# Patient Record
Sex: FEMALE | URBAN - METROPOLITAN AREA
[De-identification: names, ages, dates, MRNs, and addresses within clinical notes are randomized per-mention and may not be internally consistent; named-entity substitution may affect disease eponyms.]

---

## 2017-12-05 ENCOUNTER — INPATIENT (INPATIENT)
Facility: HOSPITAL | Age: 47
LOS: 2 days | Discharge: HOME | DRG: 629 | End: 2017-12-08
Attending: HOSPITALIST | Admitting: HOSPITALIST
Payer: MEDICARE

## 2017-12-05 VITALS
HEIGHT: 68 IN | SYSTOLIC BLOOD PRESSURE: 121 MMHG | RESPIRATION RATE: 16 BRPM | HEART RATE: 92 BPM | TEMPERATURE: 99 F | WEIGHT: 235.01 LBS | DIASTOLIC BLOOD PRESSURE: 64 MMHG | OXYGEN SATURATION: 99 %

## 2017-12-05 DIAGNOSIS — Z29.9 ENCOUNTER FOR PROPHYLACTIC MEASURES, UNSPECIFIED: ICD-10-CM

## 2017-12-05 DIAGNOSIS — N18.9 CHRONIC KIDNEY DISEASE, UNSPECIFIED: ICD-10-CM

## 2017-12-05 DIAGNOSIS — E11.621 TYPE 2 DIABETES MELLITUS WITH FOOT ULCER: ICD-10-CM

## 2017-12-05 DIAGNOSIS — I10 ESSENTIAL (PRIMARY) HYPERTENSION: ICD-10-CM

## 2017-12-05 DIAGNOSIS — E11.9 TYPE 2 DIABETES MELLITUS WITHOUT COMPLICATIONS: ICD-10-CM

## 2017-12-05 LAB
ALBUMIN SERPL ELPH-MCNC: 2.5 G/DL — LOW (ref 3.5–5)
ALP SERPL-CCNC: 139 U/L — HIGH (ref 40–120)
ALT FLD-CCNC: 13 U/L DA — SIGNIFICANT CHANGE UP (ref 10–60)
ANION GAP SERPL CALC-SCNC: 10 MMOL/L — SIGNIFICANT CHANGE UP (ref 5–17)
APPEARANCE UR: CLEAR — SIGNIFICANT CHANGE UP
APTT BLD: 36.2 SEC — SIGNIFICANT CHANGE UP (ref 27.5–37.4)
AST SERPL-CCNC: 14 U/L — SIGNIFICANT CHANGE UP (ref 10–40)
BASOPHILS # BLD AUTO: 0.1 K/UL — SIGNIFICANT CHANGE UP (ref 0–0.2)
BASOPHILS NFR BLD AUTO: 1 % — SIGNIFICANT CHANGE UP (ref 0–2)
BILIRUB SERPL-MCNC: 0.2 MG/DL — SIGNIFICANT CHANGE UP (ref 0.2–1.2)
BILIRUB UR-MCNC: NEGATIVE — SIGNIFICANT CHANGE UP
BUN SERPL-MCNC: 56 MG/DL — HIGH (ref 7–18)
CALCIUM SERPL-MCNC: 8.3 MG/DL — LOW (ref 8.4–10.5)
CHLORIDE SERPL-SCNC: 113 MMOL/L — HIGH (ref 96–108)
CO2 SERPL-SCNC: 19 MMOL/L — LOW (ref 22–31)
COLOR SPEC: YELLOW — SIGNIFICANT CHANGE UP
CREAT SERPL-MCNC: 6.81 MG/DL — HIGH (ref 0.5–1.3)
DIFF PNL FLD: ABNORMAL
EOSINOPHIL # BLD AUTO: 0.1 K/UL — SIGNIFICANT CHANGE UP (ref 0–0.5)
EOSINOPHIL NFR BLD AUTO: 0.8 % — SIGNIFICANT CHANGE UP (ref 0–6)
GLUCOSE SERPL-MCNC: 123 MG/DL — HIGH (ref 70–99)
GLUCOSE UR QL: 250
HCT VFR BLD CALC: 24.7 % — LOW (ref 34.5–45)
HGB BLD-MCNC: 8.4 G/DL — LOW (ref 11.5–15.5)
INR BLD: 0.97 RATIO — SIGNIFICANT CHANGE UP (ref 0.88–1.16)
KETONES UR-MCNC: NEGATIVE — SIGNIFICANT CHANGE UP
LACTATE SERPL-SCNC: 0.8 MMOL/L — SIGNIFICANT CHANGE UP (ref 0.7–2)
LEUKOCYTE ESTERASE UR-ACNC: NEGATIVE — SIGNIFICANT CHANGE UP
LYMPHOCYTES # BLD AUTO: 1.3 K/UL — SIGNIFICANT CHANGE UP (ref 1–3.3)
LYMPHOCYTES # BLD AUTO: 15.9 % — SIGNIFICANT CHANGE UP (ref 13–44)
MCHC RBC-ENTMCNC: 30.4 PG — SIGNIFICANT CHANGE UP (ref 27–34)
MCHC RBC-ENTMCNC: 34.2 GM/DL — SIGNIFICANT CHANGE UP (ref 32–36)
MCV RBC AUTO: 89 FL — SIGNIFICANT CHANGE UP (ref 80–100)
MONOCYTES # BLD AUTO: 0.4 K/UL — SIGNIFICANT CHANGE UP (ref 0–0.9)
MONOCYTES NFR BLD AUTO: 4.9 % — SIGNIFICANT CHANGE UP (ref 2–14)
NEUTROPHILS # BLD AUTO: 6.4 K/UL — SIGNIFICANT CHANGE UP (ref 1.8–7.4)
NEUTROPHILS NFR BLD AUTO: 77.4 % — HIGH (ref 43–77)
NITRITE UR-MCNC: NEGATIVE — SIGNIFICANT CHANGE UP
PH UR: 6 — SIGNIFICANT CHANGE UP (ref 5–8)
PLATELET # BLD AUTO: 215 K/UL — SIGNIFICANT CHANGE UP (ref 150–400)
POTASSIUM SERPL-MCNC: 4.3 MMOL/L — SIGNIFICANT CHANGE UP (ref 3.5–5.3)
POTASSIUM SERPL-SCNC: 4.3 MMOL/L — SIGNIFICANT CHANGE UP (ref 3.5–5.3)
PROT SERPL-MCNC: 7.3 G/DL — SIGNIFICANT CHANGE UP (ref 6–8.3)
PROT UR-MCNC: 500 MG/DL
PROTHROM AB SERPL-ACNC: 10.6 SEC — SIGNIFICANT CHANGE UP (ref 9.8–12.7)
RBC # BLD: 2.77 M/UL — LOW (ref 3.8–5.2)
RBC # FLD: 13 % — SIGNIFICANT CHANGE UP (ref 10.3–14.5)
SODIUM SERPL-SCNC: 142 MMOL/L — SIGNIFICANT CHANGE UP (ref 135–145)
SP GR SPEC: 1.02 — SIGNIFICANT CHANGE UP (ref 1.01–1.02)
UROBILINOGEN FLD QL: NEGATIVE — SIGNIFICANT CHANGE UP
WBC # BLD: 8.3 K/UL — SIGNIFICANT CHANGE UP (ref 3.8–10.5)
WBC # FLD AUTO: 8.3 K/UL — SIGNIFICANT CHANGE UP (ref 3.8–10.5)

## 2017-12-05 PROCEDURE — 99285 EMERGENCY DEPT VISIT HI MDM: CPT

## 2017-12-05 PROCEDURE — 99223 1ST HOSP IP/OBS HIGH 75: CPT | Mod: GC

## 2017-12-05 PROCEDURE — 93925 LOWER EXTREMITY STUDY: CPT | Mod: 26

## 2017-12-05 PROCEDURE — 73660 X-RAY EXAM OF TOE(S): CPT | Mod: 26,LT

## 2017-12-05 RX ORDER — PIPERACILLIN AND TAZOBACTAM 4; .5 G/20ML; G/20ML
3.38 INJECTION, POWDER, LYOPHILIZED, FOR SOLUTION INTRAVENOUS ONCE
Qty: 0 | Refills: 0 | Status: COMPLETED | OUTPATIENT
Start: 2017-12-05 | End: 2017-12-05

## 2017-12-05 RX ORDER — NIFEDIPINE 30 MG
120 TABLET, EXTENDED RELEASE 24 HR ORAL DAILY
Qty: 0 | Refills: 0 | Status: DISCONTINUED | OUTPATIENT
Start: 2017-12-05 | End: 2017-12-06

## 2017-12-05 RX ORDER — RANOLAZINE 500 MG/1
1 TABLET, FILM COATED, EXTENDED RELEASE ORAL
Qty: 0 | Refills: 0 | COMMUNITY

## 2017-12-05 RX ORDER — NIFEDIPINE 30 MG
120 TABLET, EXTENDED RELEASE 24 HR ORAL
Qty: 0 | Refills: 0 | COMMUNITY

## 2017-12-05 RX ORDER — ATORVASTATIN CALCIUM 80 MG/1
1 TABLET, FILM COATED ORAL
Qty: 0 | Refills: 0 | COMMUNITY

## 2017-12-05 RX ORDER — INFLUENZA VIRUS VACCINE 15; 15; 15; 15 UG/.5ML; UG/.5ML; UG/.5ML; UG/.5ML
0.5 SUSPENSION INTRAMUSCULAR ONCE
Qty: 0 | Refills: 0 | Status: COMPLETED | OUTPATIENT
Start: 2017-12-05 | End: 2017-12-08

## 2017-12-05 RX ORDER — INSULIN LISPRO 100/ML
20 VIAL (ML) SUBCUTANEOUS
Qty: 0 | Refills: 0 | Status: DISCONTINUED | OUTPATIENT
Start: 2017-12-05 | End: 2017-12-05

## 2017-12-05 RX ORDER — INSULIN LISPRO 100/ML
VIAL (ML) SUBCUTANEOUS
Qty: 0 | Refills: 0 | Status: DISCONTINUED | OUTPATIENT
Start: 2017-12-05 | End: 2017-12-08

## 2017-12-05 RX ORDER — INSULIN ASPART 100 [IU]/ML
20 INJECTION, SOLUTION SUBCUTANEOUS
Qty: 0 | Refills: 0 | COMMUNITY

## 2017-12-05 RX ORDER — INSULIN LISPRO 100/ML
15 VIAL (ML) SUBCUTANEOUS
Qty: 0 | Refills: 0 | Status: DISCONTINUED | OUTPATIENT
Start: 2017-12-05 | End: 2017-12-08

## 2017-12-05 RX ORDER — INSULIN GLARGINE 100 [IU]/ML
50 INJECTION, SOLUTION SUBCUTANEOUS AT BEDTIME
Qty: 0 | Refills: 0 | Status: DISCONTINUED | OUTPATIENT
Start: 2017-12-05 | End: 2017-12-05

## 2017-12-05 RX ORDER — FERROUS SULFATE 325(65) MG
325 TABLET ORAL DAILY
Qty: 0 | Refills: 0 | Status: DISCONTINUED | OUTPATIENT
Start: 2017-12-05 | End: 2017-12-08

## 2017-12-05 RX ORDER — VANCOMYCIN HCL 1 G
1000 VIAL (EA) INTRAVENOUS ONCE
Qty: 0 | Refills: 0 | Status: COMPLETED | OUTPATIENT
Start: 2017-12-05 | End: 2017-12-05

## 2017-12-05 RX ORDER — ASPIRIN/CALCIUM CARB/MAGNESIUM 324 MG
81 TABLET ORAL DAILY
Qty: 0 | Refills: 0 | Status: DISCONTINUED | OUTPATIENT
Start: 2017-12-05 | End: 2017-12-08

## 2017-12-05 RX ORDER — INSULIN DETEMIR 100/ML (3)
50 INSULIN PEN (ML) SUBCUTANEOUS
Qty: 0 | Refills: 0 | COMMUNITY

## 2017-12-05 RX ORDER — PIPERACILLIN AND TAZOBACTAM 4; .5 G/20ML; G/20ML
3.38 INJECTION, POWDER, LYOPHILIZED, FOR SOLUTION INTRAVENOUS EVERY 12 HOURS
Qty: 0 | Refills: 0 | Status: DISCONTINUED | OUTPATIENT
Start: 2017-12-05 | End: 2017-12-08

## 2017-12-05 RX ORDER — ASPIRIN/CALCIUM CARB/MAGNESIUM 324 MG
1 TABLET ORAL
Qty: 0 | Refills: 0 | COMMUNITY

## 2017-12-05 RX ORDER — FERROUS SULFATE 325(65) MG
1 TABLET ORAL
Qty: 0 | Refills: 0 | COMMUNITY

## 2017-12-05 RX ORDER — ATORVASTATIN CALCIUM 80 MG/1
40 TABLET, FILM COATED ORAL AT BEDTIME
Qty: 0 | Refills: 0 | Status: DISCONTINUED | OUTPATIENT
Start: 2017-12-05 | End: 2017-12-08

## 2017-12-05 RX ORDER — RANOLAZINE 500 MG/1
1000 TABLET, FILM COATED, EXTENDED RELEASE ORAL
Qty: 0 | Refills: 0 | Status: DISCONTINUED | OUTPATIENT
Start: 2017-12-05 | End: 2017-12-08

## 2017-12-05 RX ORDER — INSULIN GLARGINE 100 [IU]/ML
40 INJECTION, SOLUTION SUBCUTANEOUS AT BEDTIME
Qty: 0 | Refills: 0 | Status: DISCONTINUED | OUTPATIENT
Start: 2017-12-05 | End: 2017-12-06

## 2017-12-05 RX ADMIN — Medication 1: at 18:41

## 2017-12-05 RX ADMIN — Medication 250 MILLIGRAM(S): at 16:00

## 2017-12-05 RX ADMIN — Medication 325 MILLIGRAM(S): at 23:25

## 2017-12-05 RX ADMIN — ATORVASTATIN CALCIUM 40 MILLIGRAM(S): 80 TABLET, FILM COATED ORAL at 23:25

## 2017-12-05 RX ADMIN — Medication 81 MILLIGRAM(S): at 23:25

## 2017-12-05 RX ADMIN — Medication 15 UNIT(S): at 18:41

## 2017-12-05 RX ADMIN — PIPERACILLIN AND TAZOBACTAM 100 GRAM(S): 4; .5 INJECTION, POWDER, LYOPHILIZED, FOR SOLUTION INTRAVENOUS at 16:01

## 2017-12-05 RX ADMIN — Medication 25 MILLIGRAM(S): at 23:30

## 2017-12-05 NOTE — ED ADULT NURSE NOTE - OBJECTIVE STATEMENT
presents tot he ER , states sent by pmd for admission for necrotic 2nd toe Left foot , pt reports mild pain . noted Lt 2nd toe w/ open wound and necrosis on top of wound . + pulses .

## 2017-12-05 NOTE — ED PROVIDER NOTE - SKIN WOUND DESCRIPTION
L foot w/ faint DP pulse, foot is warm, L 2nd toe is darker in color that the other toes w/ 1cm dry ulcer on the dorsal aspect of the toe, no active drainage, no fould odor. L foot w/ faint DP pulse, foot is warm, L 2nd toe is duskier in color that the other toes w/ 1cm dry ulcer on the dorsal aspect of the toe, no active drainage, no foul odor.

## 2017-12-05 NOTE — H&P ADULT - PMH
DM (diabetes mellitus)    Essential hypertension  Hypertension  Hyperlipidemia  Dyslipidemia  Type 2 diabetes mellitus  Diabetes

## 2017-12-05 NOTE — H&P ADULT - PROBLEM SELECTOR PLAN 1
-Patient presented with L foot ulcer since last 2 weeks with no leucocytosis or fever.  -Xray showed Age-indeterminate deformity distal aspect of second proximal phalanx. Soft tissue swelling. No gross bone destruction  -S/p Vancomycin 1gm 1 dose. Follow Vanco level in Am since GFR is 7, standing order is not placed -Patient presented with L foot ulcer since last 2 weeks with no leucocytosis or fever.  -Xray showed Age-indeterminate deformity distal aspect of second proximal phalanx. Soft tissue swelling. No gross bone destruction  -S/p Vancomycin 1gm 1 dose. Follow Vanco level in Am since GFR is 7, and out vanco order accordingly   -Zosyn q 12, renally dosed discussed with attending.

## 2017-12-05 NOTE — H&P ADULT - PROBLEM SELECTOR PLAN 2
-Patient is on Levemir 50 and Novolog 20 TID, decreased it to lantus 40 and humalog 15 TID as patient can have decreased appetite in hospital. Increase Lantus/Humalog if FG runs high.  -Follow hbA1c  -Trend blood sugars

## 2017-12-05 NOTE — CONSULT NOTE ADULT - SUBJECTIVE AND OBJECTIVE BOX
S : 47y year old Female legally blind with PMH DM, HTN, HLD, neuropathy secondary to diabetes seen at bedside for left 2nd dorsal digital ulceration.  Patient relates to having the ulceration for 2 weeks.  Patient does not know how the ulceration appears, she states it could be possibly rubbing against tight shoes or wearing compression stockings. Patient saw a Podiatrist on 11/29 who recommended she see Dr. Florian for a second opinion and possible surgical intervention. Patients previous Podiatrist also recommended following up with a vascular surgeon but patient could not get an appointment with one. Patient has finished a course of Augmentin without relief. Patient saw Dr. Florian this morning who advised her to come to the ED for a further work up of the left second toe ulceration. Patient states she feels no pain in the toe. Patient complains the feet feel cold. Patient states she cannot assess if the ulcer is getting better or worse as she is legally blind.     Chief Complaint : Patient is a 47y old  Female who presents with a chief complaint of left 2nd toe ulceration     Patient admits to  (-) Fevers, (-) Chills, (-) Nausea, (-) Vomiting, (-) Shortness of Breath      PMH: DM (diabetes mellitus)  Type 2 diabetes mellitus  Hyperlipidemia  Essential hypertension    PSH:Other postprocedural status      Allergies:No Known Allergies      Labs:                          8.4    8.3   )-----------( 215      ( 05 Dec 2017 12:32 )             24.7     WBC Trend  8.3 Date (12-05 @ 12:32)      Chem  12-05    142  |  113<H>  |  56<H>  ----------------------------<  123<H>  4.3   |  19<L>  |  6.81<H>    Ca    8.3<L>      05 Dec 2017 12:32    TPro  7.3  /  Alb  2.5<L>  /  TBili  0.2  /  DBili  x   /  AST  14  /  ALT  13  /  AlkPhos  139<H>  12-05          T(F): 98.4 (12-05-17 @ 14:50), Max: 98.6 (12-05-17 @ 10:16)  HR: 92 (12-05-17 @ 14:50) (92 - 92)  BP: 146/72 (12-05-17 @ 14:50) (121/64 - 146/72)  RR: 18 (12-05-17 @ 14:50) (16 - 18)  SpO2: 100% (12-05-17 @ 14:50) (99% - 100%)  Wt(kg): --    O:   General: Pleasant  female NAD & AOX3.    Integument:  Skin warm, dry and supple bilateral.    Ulceration Left 2nd dorsal digit: + hyperkeratotic border, wound base Fibrogranular , wound size 1x 1 x 0.1 cm. +edema,  + alvaro-wound erythema, - purulence, - fluctuance, + tracking/tunneling, + probe to periosteum   Vascular: Dorsalis Pedis and Posterior Tibial pulses non palpable. Capillary re-fill time greater than 3 seconds   Neuro: Protective sensation diminished  to the level of the digits bilateral.  MSK: Muscle strength 5/5 all major muscle groups bilateral.    Deformity:  A: Left 2nd dorsal digital ulceration, ischemic toe     P:   Chart reviewed and Patient evaluated  Discussed diagnosis and treatment with patient  Wound flush with normal saline  Obtained wound culture to be sent to Pathology  Applied dry sterile dressing  X-rays reviewed : < from: Xray Toes, Left Foot (12.05.17 @ 13:01) >  FINDINGS:  Age-indeterminate deformity distal aspect of second proximal phalanx.   Soft tissue swelling. No gross bone destruction.   No dislocation   IMPRESSION:  Findings as described above . If osteomyelitis is a strong clinical   concern, consider MRI exam if there is no contraindication.  Continue with IV antibiotics As Per ID  Ordered CHEYENNE, pending   Vascular consult appreciated   WBAT bilaterally   Discussed importance of daily foot examinations and proper shoe gear and to importance of lower Fasting Blood Glucose levels.   Podiatry will follow while in house.  Discussed with Dr. Florian

## 2017-12-05 NOTE — H&P ADULT - PROBLEM SELECTOR PLAN 3
-Patient cr. is 6.8, baseline is >5 as per patient.   -No fluids given, as patient is not dehydrated.   -Not on HD

## 2017-12-05 NOTE — H&P ADULT - HISTORY OF PRESENT ILLNESS
47 F with PMH of Htn, DM, anemia, peripheral neuropathy presented to ED with l foot ulcer on 2nd toe x 2 weeks. Patient went to Dr Ghosh office and was prescribed Augmentin for 5 days but ulcer didn't improved. Also reports constipation and mild leg swellings. Denies urinary frequency and urgency. Denies fever, chills, SOB, nausea vomiting, diarrhea, chest pain, headache. As per patient she had cardiac cath done 1 year ago due to chest pain and that was normal.     SH: non smoker, non alcoholic, denies illicit drug use.   FH: Breat cancer in Mother and grand mother. 47 F with PMH of Htn, DM, anemia, peripheral neuropathy presented to ED with l foot ulcer on 2nd toe x 2 weeks. Patient went to Dr Ghosh office and was prescribed Augmentin for 5 days but ulcer didn't improved. Also reports constipation and mild leg swellings. Denies urinary frequency and urgency. Denies fever, chills, SOB, nausea vomiting, diarrhea, chest pain, headache or blood in stool. As per patient she had cardiac cath done 1 year ago due to chest pain and that was normal.     SH: non smoker, non alcoholic, denies illicit drug use.   FH: Breat cancer in Mother and grand mother.

## 2017-12-05 NOTE — H&P ADULT - ASSESSMENT
47 F with PMH of Htn, DM, anemia, peripheral neuropathy presented to ED with l foot ulcer on 2nd toe x 2 weeks.

## 2017-12-05 NOTE — ED PROVIDER NOTE - OBJECTIVE STATEMENT
46 y/o F pt w/ PMHx of DM was sent in by Dr. Tran for L toe ulcer. Pt states that she has had the ulceration to L toe for 2 weeks; pt has been taking Augmentin to no relief. Pt has peripheral neuropathy and does not feel any of her toes. Pt denies fever, chills, or any other complaints. NKDA. 48 y/o F pt w/ PMHx of DM was sent in by Dr. Cedeno for L toe ulcer. Pt states that she has had the ulceration to L toe for 2 weeks; pt has been taking Augmentin to no relief. Pt has peripheral neuropathy and does not feel any of her toes. Pt denies fever, chills, or any other complaints. NKDA. patient has chronic renal insufficiency, last creatinine >5, not on dialysis

## 2017-12-05 NOTE — H&P ADULT - ATTENDING COMMENTS
Patient is seen and examined in ED with podiatry resident. She is 47 F with PMH of Htn, DM, anemia, peripheral neuropathy presented to ED with l foot ulcer on 2nd toe x 2 weeks. Patient went to Dr Ghosh office and was prescribed Augmentin for 5 days but ulcer didn't improved. Also reports constipation and mild leg swellings. Denies urinary frequency and urgency. Denies fever, chills, SOB, nausea vomiting, diarrhea, chest pain, headache or blood in stool. As per patient she had cardiac cath done 1 year ago due to chest pain and that was normal.     ROS and PE as above    48 yo female presented with left 2 toe ulcer    1. Left second toe ulcer, non healing, diabetic ulcer, concern for osteomyelitis  Afebrile, no leukocytosis   The second toe is black, not painless.  Will get CHEYENNE, if CHEYENNE positive, will get vascular involved   Scheduled for MRI   BC sent   c/w Zosyn for now    2. DM; f/u HA1C  c/w with Lantus 40 un and Humalog 15 un TID   3. CKD; baseline cr. between 5-6. Patient follows as outpatient.   4. HTN: BP controlled: c/w home dose if Nifedipine  mg PO qd   5. Anemia of chronic disease vs renal disease   Will send anemia panel.   6. DVT prophylaxis: On Heparin

## 2017-12-06 ENCOUNTER — RESULT REVIEW (OUTPATIENT)
Age: 47
End: 2017-12-06

## 2017-12-06 ENCOUNTER — TRANSCRIPTION ENCOUNTER (OUTPATIENT)
Age: 47
End: 2017-12-06

## 2017-12-06 DIAGNOSIS — N17.9 ACUTE KIDNEY FAILURE, UNSPECIFIED: ICD-10-CM

## 2017-12-06 DIAGNOSIS — R60.0 LOCALIZED EDEMA: ICD-10-CM

## 2017-12-06 DIAGNOSIS — I12.9 HYPERTENSIVE CHRONIC KIDNEY DISEASE WITH STAGE 1 THROUGH STAGE 4 CHRONIC KIDNEY DISEASE, OR UNSPECIFIED CHRONIC KIDNEY DISEASE: ICD-10-CM

## 2017-12-06 DIAGNOSIS — N18.9 CHRONIC KIDNEY DISEASE, UNSPECIFIED: ICD-10-CM

## 2017-12-06 LAB
ANION GAP SERPL CALC-SCNC: 11 MMOL/L — SIGNIFICANT CHANGE UP (ref 5–17)
BUN SERPL-MCNC: 58 MG/DL — HIGH (ref 7–18)
CALCIUM SERPL-MCNC: 8.2 MG/DL — LOW (ref 8.4–10.5)
CHLORIDE SERPL-SCNC: 113 MMOL/L — HIGH (ref 96–108)
CHOLEST SERPL-MCNC: 159 MG/DL — SIGNIFICANT CHANGE UP (ref 10–199)
CO2 SERPL-SCNC: 17 MMOL/L — LOW (ref 22–31)
CREAT SERPL-MCNC: 6.92 MG/DL — HIGH (ref 0.5–1.3)
CULTURE RESULTS: NO GROWTH — SIGNIFICANT CHANGE UP
FERRITIN SERPL-MCNC: 35 NG/ML — SIGNIFICANT CHANGE UP (ref 15–150)
GLUCOSE SERPL-MCNC: 253 MG/DL — HIGH (ref 70–99)
HBA1C BLD-MCNC: 9.2 % — HIGH (ref 4–5.6)
HCT VFR BLD CALC: 23 % — LOW (ref 34.5–45)
HDLC SERPL-MCNC: 51 MG/DL — SIGNIFICANT CHANGE UP (ref 40–125)
HGB BLD-MCNC: 7.6 G/DL — LOW (ref 11.5–15.5)
IRON SATN MFR SERPL: 36 % — SIGNIFICANT CHANGE UP (ref 15–50)
IRON SATN MFR SERPL: 85 UG/DL — SIGNIFICANT CHANGE UP (ref 40–150)
LIPID PNL WITH DIRECT LDL SERPL: 77 MG/DL — SIGNIFICANT CHANGE UP
MAGNESIUM SERPL-MCNC: 1.9 MG/DL — SIGNIFICANT CHANGE UP (ref 1.6–2.6)
MCHC RBC-ENTMCNC: 29.8 PG — SIGNIFICANT CHANGE UP (ref 27–34)
MCHC RBC-ENTMCNC: 33.1 GM/DL — SIGNIFICANT CHANGE UP (ref 32–36)
MCV RBC AUTO: 90.1 FL — SIGNIFICANT CHANGE UP (ref 80–100)
PHOSPHATE SERPL-MCNC: 5 MG/DL — HIGH (ref 2.5–4.5)
PLATELET # BLD AUTO: 190 K/UL — SIGNIFICANT CHANGE UP (ref 150–400)
POTASSIUM SERPL-MCNC: 4.5 MMOL/L — SIGNIFICANT CHANGE UP (ref 3.5–5.3)
POTASSIUM SERPL-SCNC: 4.5 MMOL/L — SIGNIFICANT CHANGE UP (ref 3.5–5.3)
POTASSIUM UR-SCNC: 15 MMOL/L — LOW (ref 25–125)
RBC # BLD: 2.56 M/UL — LOW (ref 3.8–5.2)
RBC # FLD: 13.2 % — SIGNIFICANT CHANGE UP (ref 10.3–14.5)
SODIUM SERPL-SCNC: 141 MMOL/L — SIGNIFICANT CHANGE UP (ref 135–145)
SODIUM UR-SCNC: 89 MMOL/L — SIGNIFICANT CHANGE UP (ref 40–220)
SPECIMEN SOURCE: SIGNIFICANT CHANGE UP
TIBC SERPL-MCNC: 235 UG/DL — LOW (ref 250–450)
TOTAL CHOLESTEROL/HDL RATIO MEASUREMENT: 3.1 RATIO — LOW (ref 3.3–7.1)
TRANSFERRIN SERPL-MCNC: 181 MG/DL — LOW (ref 200–360)
TRIGL SERPL-MCNC: 156 MG/DL — HIGH (ref 10–149)
TSH SERPL-MCNC: 1.72 UU/ML — SIGNIFICANT CHANGE UP (ref 0.34–4.82)
UIBC SERPL-MCNC: 150 UG/DL — SIGNIFICANT CHANGE UP (ref 110–370)
VANCOMYCIN TROUGH SERPL-MCNC: 12.7 UG/ML — SIGNIFICANT CHANGE UP (ref 10–20)
VIT B12 SERPL-MCNC: 686 PG/ML — SIGNIFICANT CHANGE UP (ref 243–894)
WBC # BLD: 7.3 K/UL — SIGNIFICANT CHANGE UP (ref 3.8–10.5)
WBC # FLD AUTO: 7.3 K/UL — SIGNIFICANT CHANGE UP (ref 3.8–10.5)

## 2017-12-06 PROCEDURE — 76775 US EXAM ABDO BACK WALL LIM: CPT | Mod: 26

## 2017-12-06 PROCEDURE — 88305 TISSUE EXAM BY PATHOLOGIST: CPT | Mod: 26

## 2017-12-06 PROCEDURE — 88311 DECALCIFY TISSUE: CPT | Mod: 26

## 2017-12-06 PROCEDURE — 99233 SBSQ HOSP IP/OBS HIGH 50: CPT | Mod: GC

## 2017-12-06 RX ORDER — VANCOMYCIN HCL 1 G
500 VIAL (EA) INTRAVENOUS ONCE
Qty: 0 | Refills: 0 | Status: COMPLETED | OUTPATIENT
Start: 2017-12-06 | End: 2017-12-06

## 2017-12-06 RX ORDER — INSULIN LISPRO 100/ML
VIAL (ML) SUBCUTANEOUS AT BEDTIME
Qty: 0 | Refills: 0 | Status: DISCONTINUED | OUTPATIENT
Start: 2017-12-06 | End: 2017-12-08

## 2017-12-06 RX ORDER — NITROGLYCERIN 6.5 MG
1 CAPSULE, EXTENDED RELEASE ORAL ONCE
Qty: 0 | Refills: 0 | Status: COMPLETED | OUTPATIENT
Start: 2017-12-06 | End: 2017-12-06

## 2017-12-06 RX ORDER — NITROGLYCERIN 6.5 MG
1 CAPSULE, EXTENDED RELEASE ORAL DAILY
Qty: 0 | Refills: 0 | Status: DISCONTINUED | OUTPATIENT
Start: 2017-12-07 | End: 2017-12-08

## 2017-12-06 RX ORDER — NIFEDIPINE 30 MG
120 TABLET, EXTENDED RELEASE 24 HR ORAL AT BEDTIME
Qty: 0 | Refills: 0 | Status: DISCONTINUED | OUTPATIENT
Start: 2017-12-06 | End: 2017-12-08

## 2017-12-06 RX ORDER — METOPROLOL TARTRATE 50 MG
25 TABLET ORAL
Qty: 0 | Refills: 0 | Status: DISCONTINUED | OUTPATIENT
Start: 2017-12-06 | End: 2017-12-08

## 2017-12-06 RX ORDER — INSULIN LISPRO 100/ML
2 VIAL (ML) SUBCUTANEOUS ONCE
Qty: 0 | Refills: 0 | Status: COMPLETED | OUTPATIENT
Start: 2017-12-06 | End: 2017-12-06

## 2017-12-06 RX ORDER — NITROGLYCERIN 6.5 MG
CAPSULE, EXTENDED RELEASE ORAL
Qty: 0 | Refills: 0 | Status: DISCONTINUED | OUTPATIENT
Start: 2017-12-06 | End: 2017-12-08

## 2017-12-06 RX ADMIN — Medication 25 MILLIGRAM(S): at 17:47

## 2017-12-06 RX ADMIN — ATORVASTATIN CALCIUM 40 MILLIGRAM(S): 80 TABLET, FILM COATED ORAL at 22:03

## 2017-12-06 RX ADMIN — RANOLAZINE 1000 MILLIGRAM(S): 500 TABLET, FILM COATED, EXTENDED RELEASE ORAL at 06:11

## 2017-12-06 RX ADMIN — Medication 2 UNIT(S): at 22:03

## 2017-12-06 RX ADMIN — Medication 120 MILLIGRAM(S): at 06:10

## 2017-12-06 RX ADMIN — Medication 100 MILLIGRAM(S): at 11:04

## 2017-12-06 RX ADMIN — Medication 325 MILLIGRAM(S): at 17:49

## 2017-12-06 RX ADMIN — Medication 3: at 09:24

## 2017-12-06 RX ADMIN — Medication 25 MILLIGRAM(S): at 22:03

## 2017-12-06 RX ADMIN — Medication 81 MILLIGRAM(S): at 17:49

## 2017-12-06 RX ADMIN — Medication 15 UNIT(S): at 12:47

## 2017-12-06 RX ADMIN — PIPERACILLIN AND TAZOBACTAM 12.5 GRAM(S): 4; .5 INJECTION, POWDER, LYOPHILIZED, FOR SOLUTION INTRAVENOUS at 06:10

## 2017-12-06 RX ADMIN — RANOLAZINE 1000 MILLIGRAM(S): 500 TABLET, FILM COATED, EXTENDED RELEASE ORAL at 17:47

## 2017-12-06 RX ADMIN — Medication 15 UNIT(S): at 09:24

## 2017-12-06 RX ADMIN — PIPERACILLIN AND TAZOBACTAM 12.5 GRAM(S): 4; .5 INJECTION, POWDER, LYOPHILIZED, FOR SOLUTION INTRAVENOUS at 17:47

## 2017-12-06 RX ADMIN — Medication 120 MILLIGRAM(S): at 22:03

## 2017-12-06 RX ADMIN — Medication 2: at 12:47

## 2017-12-06 NOTE — PROGRESS NOTE ADULT - ASSESSMENT
47 F with PMH of Htn, DM, anemia, peripheral neuropathy presented to ED with l foot ulcer on 2nd toe x 2 weeks. 47 F with PMH of Htn, DM, anemia, peripheral neuropathy presented to ED with l foot ulcer on 2nd toe x 2 weeks.    Podiatry Obtained biopsy to be sent to pathology.  -Podiatry performed bedside debridement of 2nd   -Ordered NitroPaste to be applied to Left foot.  -pending MRI results

## 2017-12-06 NOTE — DISCHARGE NOTE ADULT - PATIENT PORTAL LINK FT
“You can access the [facility] Patient Portal, offered by [facility], by registering with the following website: [email or web address]”

## 2017-12-06 NOTE — CONSULT NOTE ADULT - PROBLEM SELECTOR RECOMMENDATION 2
CKD Stage unclear: due to diabetic nephropathy  Renal function stable. Avoid nephrotoxins/ RCA/ NSAIDs. Monitor BMP.  Obtain baseline creatinine from outpatient nephrologist

## 2017-12-06 NOTE — DISCHARGE NOTE ADULT - CARE PROVIDER_API CALL
Todd Florian (HOLDEN), Foot and Ankle Surgery; Podiatric Medicine and Surgery; [facility] RearfootAnkle Surgery  [address] [place] [address]  Phone: [phone]  Fax: [phone]

## 2017-12-06 NOTE — DISCHARGE NOTE ADULT - HOSPITAL COURSE
47 F with PMH of Htn, DM, anemia, peripheral neuropathy presented to ED with l foot ulcer on 2nd toe x 2 weeks. Patient went to Dr Ghosh office and was prescribed Augmentin for 5 days but ulcer didn't improved. Also reports constipation and mild leg swellings. Denies urinary frequency and urgency. Denies fever, chills, SOB, nausea vomiting, diarrhea, chest pain, headache or blood in stool. As per patient she had cardiac cath done 1 year ago due to chest pain and that was normal.     SH: non smoker, non alcoholic, denies illicit drug use.   FH: Breat cancer in Mother and grand mother.    In the ED the patient was given IV antibiotics and evaluated. Podiatry was consulted.    On the floor, Podiatry did a bedside debridement of the 2nd digit on the left foot. Zosyn was given to the patient. 47 F with PMH of Htn, DM, anemia, peripheral neuropathy presented to ED with l foot ulcer on 2nd toe x 2 weeks. Patient went to Dr Ghosh office and was prescribed Augmentin for 5 days but ulcer didn't improved. Also reports constipation and mild leg swellings. Denies urinary frequency and urgency. Denies fever, chills, SOB, nausea vomiting, diarrhea, chest pain, headache or blood in stool. As per patient she had cardiac cath done 1 year ago due to chest pain and that was normal.     SH: non smoker, non alcoholic, denies illicit drug use.   FH: Breat cancer in Mother and grand mother.    In the ED the patient was given IV antibiotics and evaluated. Podiatry was consulted.     On the floor, Podiatry did a bedside debridement of the 2nd digit on the left foot and specimen sent to pathology. Zosyn was given to the patient. MRI was taken of the foot.   Nephrology consulted and did not recommend dialysis based on previous history of elevated creatinine levels. 47 F with PMH of Htn, DM, anemia, peripheral neuropathy presented to ED with l foot ulcer on 2nd toe x 2 weeks. Patient went to Dr Ghosh office and was prescribed Augmentin for 5 days but ulcer didn't improved. Also reports constipation and mild leg swellings. Denies urinary frequency and urgency. Denies fever, chills, SOB, nausea vomiting, diarrhea, chest pain, headache or blood in stool. As per patient she had cardiac cath done 1 year ago due to chest pain and that was normal.     SH: non smoker, non alcoholic, denies illicit drug use.   FH: Breat cancer in Mother and grand mother.    In the ED the patient was given IV antibiotics and evaluated. Podiatry was consulted.     On the floor, Podiatry did a bedside debridement of the 2nd digit on the left foot and specimen sent to pathology. Zosyn was given to the patient. MRI was taken of the foot. Nephrology consulted and did not recommend dialysis based on previous history of elevated creatinine levels. ID consulted and recommended oral antibiotics upon discharge.

## 2017-12-06 NOTE — DISCHARGE NOTE ADULT - CARE PLAN
Principal Discharge DX:	Diabetic foot ulcer  Goal:	Prevent further infection of the ulcer  Instructions for follow-up, activity and diet:	You need to follow up with Dr. Ghosh for podiatric services as an outpatient.  Secondary Diagnosis:	DM (diabetes mellitus)  Instructions for follow-up, activity and diet:	You need to continue to monitor your blood sugar and take your diabetes medications accordingly  Secondary Diagnosis:	CKD (chronic kidney disease)  Instructions for follow-up, activity and diet:	You need to continue to follow up with your nephrologist, Dr. Martines at his office. Principal Discharge DX:	Diabetic foot ulcer  Goal:	Prevent further infection of the ulcer  Instructions for follow-up, activity and diet:	You need to follow up with Dr. Ghosh for podiatric services as an outpatient. You also need to go to hyperbarics to assist in healing the ulceration. Please go to your pharmacy and  the prescribed Levaquin antibiotics.  Secondary Diagnosis:	DM (diabetes mellitus)  Instructions for follow-up, activity and diet:	You need to continue to monitor your blood sugar and take your diabetes medications accordingly  Secondary Diagnosis:	CKD (chronic kidney disease)  Instructions for follow-up, activity and diet:	You need to continue to follow up with your nephrologist, Dr. Martines at his office.

## 2017-12-06 NOTE — DISCHARGE NOTE ADULT - PLAN OF CARE
Prevent further infection of the ulcer You need to follow up with Dr. Ghosh for podiatric services as an outpatient. You need to continue to monitor your blood sugar and take your diabetes medications accordingly You need to continue to follow up with your nephrologist, Dr. Martines at his office. You need to follow up with Dr. Ghosh for podiatric services as an outpatient. You also need to go to hyperbarics to assist in healing the ulceration. Please go to your pharmacy and  the prescribed Levaquin antibiotics.

## 2017-12-06 NOTE — DISCHARGE NOTE ADULT - MEDICATION SUMMARY - MEDICATIONS TO TAKE
I will START or STAY ON the medications listed below when I get home from the hospital:    aspirin 81 mg oral tablet  -- 1 tab(s) by mouth once a day  -- Indication: For Pain    Ranexa 1000 mg oral tablet, extended release  -- 1 tab(s) by mouth once a day (at bedtime)  -- Indication: For Chest pain    Lyrica 25 mg oral capsule  -- 1 cap(s) by mouth once a day  -- Indication: For nerve pain    NovoLOG 100 units/mL subcutaneous solution  -- 20 unit(s) subcutaneous 3 times a day (before meals)  -- Indication: For Diabetes    Levemir 100 units/mL subcutaneous solution  -- 50 unit(s) subcutaneous once a day (at bedtime)  -- Indication: For Diabetes    atorvastatin 40 mg oral tablet  -- 1 tab(s) by mouth once a day  -- Indication: For Cholesterol    NIFEdipine  -- 120 milligram(s) by mouth once a day  -- Indication: For high blood pressure    ferrous sulfate 325 mg (65 mg elemental iron) oral tablet  -- 1 tab(s) by mouth once a day  -- Indication: For Anemia    brimonidine 0.2% ophthalmic solution  -- 1 drop(s) to each affected eye 2 times a day  -- Indication: For glaucoma    dorzolamide 2% ophthalmic solution  -- 1 drop(s) to each affected eye 2 times a day  -- Indication: For glaucoma    latanoprost 0.005% ophthalmic solution  -- 1 drop(s) to each affected eye once a day (at bedtime)  -- Indication: For glaucoma

## 2017-12-06 NOTE — PROGRESS NOTE ADULT - PROBLEM SELECTOR PLAN 3
-Patient cr. is 6.8, baseline is >5 as per patient.   -No fluids given, as patient is not dehydrated.   -Not on HD -Patient cr. is 6.8, baseline is >5 as per patient.   -No fluids given, as patient is not dehydrated.   -Not on HD  -Dr. Edmonds consulted

## 2017-12-06 NOTE — CONSULT NOTE ADULT - SUBJECTIVE AND OBJECTIVE BOX
[facility] NEPHROLOGY- CONSULTATION NOTE    Patient is a 47y Female with known CKD, unknown baseline, cared for by Dr. Martines ([facility]), who presented to the hospital with diabetic foot ulcer.  Pt recently had a medication that was just stopped (maybe an ACEi or ARB) in attempt to improve renal function.  Pt always has some edema and reports that her edema is much better than it was several months ago. She does not think she is on a diuretic now, but is not sure.   Pt feels well overall.  No uremic symptoms such as lethargy, agitation, nausea/vomiting, difficultly sleeping. No ACEi/ARBs/NSAIDs/Diuretics/IV Contrast.      PAST MEDICAL & SURGICAL HISTORY:  DM (diabetes mellitus)  Type 2 diabetes mellitus: Diabetes  Hyperlipidemia: Dyslipidemia  Essential hypertension: Hypertension  Other postprocedural status: S/P laparoscopic cholecystectomy  CKD    No Known Allergies    Home Medications Reviewed  Hospital Medications:   MEDICATIONS  (STANDING):  aspirin  chewable 81 milliGRAM(s) Oral daily  atorvastatin 40 milliGRAM(s) Oral at bedtime  ferrous    sulfate 325 milliGRAM(s) Oral daily  influenza   Vaccine 0.5 milliLiter(s) IntraMuscular once  insulin lispro (HumaLOG) corrective regimen sliding scale   SubCutaneous three times a day before meals  insulin lispro Injectable (HumaLOG) 15 Unit(s) SubCutaneous three times a day before meals  NIFEdipine  milliGRAM(s) Oral at bedtime  nitroglycerin    2% Ointment 1 Inch(s) Transdermal once  nitroglycerin    2% Ointment      piperacillin/tazobactam IVPB. 3.375 Gram(s) IV Intermittent every 12 hours  pregabalin 25 milliGRAM(s) Oral at bedtime  ranolazine 1000 milliGRAM(s) Oral two times a day    SOCIAL HISTORY:  Denies ETOh,Smoking,   FAMILY HISTORY:    REVIEW OF SYSTEMS:  CONSTITUTIONAL: No weakness, fevers or chills  EYES/ENT: No visual changes;  No vertigo or throat pain   NECK: No pain or stiffness  RESPIRATORY: No cough, wheezing, hemoptysis; No shortness of breath  CARDIOVASCULAR: No chest pain or palpitations.  GASTROINTESTINAL: No abdominal or epigastric pain. No nausea, vomiting, or hematemesis; No diarrhea or constipation. No melena or hematochezia.  GENITOURINARY: No dysuria, frequency, foamy urine, urinary urgency, incontinence or hematuria  NEUROLOGICAL: No numbness or weakness  SKIN: No itching, burning, rashes, or lesions   VASCULAR: No bilateral lower extremity edema.   All other review of systems is negative unless indicated above.    VITALS:  T(F): 97.6 (17 @ 08:00), Max: 99 (17 @ 21:36)  HR: 93 (17 @ 08:00)  BP: 151/90 (17 @ 08:00)  RR: 18 (17 @ 08:00)  SpO2: 99% (17 @ 08:00)  Wt(kg): --      PHYSICAL EXAM:  Constitutional: NAD  HEENT: anicteric sclera, oropharynx clear, MMM  Neck: No JVD  Respiratory: CTAB, no wheezes, rales or rhonchi  Cardiovascular: S1, S2, RRR  Gastrointestinal: BS+, soft, NT/ND  Extremities: No cyanosis or clubbing. 1-2+ B LE edema  Neurological: A/O x 3, no focal deficits  Psychiatric: Normal mood, normal affect  : No CVA tenderness. No marks.   Skin: No rashes  Vascular Access: none    LABS:      141  |  113<H>  |  58<H>  ----------------------------<  253<H>  4.5   |  17<L>  |  6.92<H>    Ca    8.2<L>      06 Dec 2017 07:08  Phos  5.0       Mg     1.9         TPro  7.3  /  Alb  2.5<L>  /  TBili  0.2  /  DBili      /  AST  14  /  ALT  13  /  AlkPhos  139<H>      Creatinine Trend: 6.92 <--, 6.81 <--                        7.6    7.3   )-----------( 190      ( 06 Dec 2017 07:08 )             23.0     Urine Studies:  Urinalysis Basic - ( 05 Dec 2017 13:09 )    Color: Yellow / Appearance: Clear / S.020 / pH:   Gluc:  / Ketone: Negative  / Bili: Negative / Urobili: Negative   Blood:  / Protein: 500 mg/dL / Nitrite: Negative   Leuk Esterase: Negative / RBC: 2-5 /HPF / WBC 11-25 /HPF   Sq Epi:  / Non Sq Epi: Many /HPF / Bacteria: Negative /HPF      Sodium, Random Urine: 89 mmol/L ( @ 10:37)  Potassium, Random Urine: 15 mmol/L ( @ 10:37)  Creatinine, Random Urine: 43 mg/dL ( @ 10:37)

## 2017-12-06 NOTE — CONSULT NOTE ADULT - SUBJECTIVE AND OBJECTIVE BOX
47 F with PMH of Htn, DM, anemia, peripheral neuropathy presented to ED with l foot ulcer on 2nd toe x 2 weeks. Patient went to Dr Ghosh office and was prescribed Augmentin for 5 days but ulcer didn't improved. Pt had bedside debridement, wound culture and biopsy today. Pt is schedule for MRI to r/o OM since XR findings were questionable. Afebrile, no leukocytosis.       FH: Breat cancer in Mother and grand mother.    REVIEW OF SYSTEMS:  [  ] Not able to illicit    General: NAD  Chest: no chest pain   GI: no abdominal pain   : no dysuria no urinary frequency,   Skin: no rash, no pruritus   Musculoskeletal:	 intact ROM, no pain  Neuro: AAOx3    PAST MEDICAL & SURGICAL HISTORY:  DM (diabetes mellitus)  Type 2 diabetes mellitus: Diabetes  Hyperlipidemia: Dyslipidemia  Essential hypertension: Hypertension  Other postprocedural status: S/P laparoscopic cholecystectomy    ALLERGIES: No Known Allergies    MEDS:  aspirin  chewable 81 milliGRAM(s) Oral daily  atorvastatin 40 milliGRAM(s) Oral at bedtime  ferrous    sulfate 325 milliGRAM(s) Oral daily  influenza   Vaccine 0.5 milliLiter(s) IntraMuscular once  insulin lispro (HumaLOG) corrective regimen sliding scale   SubCutaneous three times a day before meals  insulin lispro Injectable (HumaLOG) 15 Unit(s) SubCutaneous three times a day before meals  NIFEdipine  milliGRAM(s) Oral daily  nitroglycerin    2% Ointment 1 Inch(s) Transdermal once  nitroglycerin    2% Ointment      piperacillin/tazobactam IVPB. 3.375 Gram(s) IV Intermittent every 12 hours  pregabalin 25 milliGRAM(s) Oral daily  ranolazine 1000 milliGRAM(s) Oral two times a day    SOCIAL HISTORY:  Smoker:      FAMILY HISTORY:    VITALS:  Vital Signs Last 24 Hrs  T(C): 36.4 (06 Dec 2017 08:00), Max: 37.2 (05 Dec 2017 21:36)  T(F): 97.6 (06 Dec 2017 08:00), Max: 99 (05 Dec 2017 21:36)  HR: 93 (06 Dec 2017 08:00) (92 - 99)  BP: 151/90 (06 Dec 2017 08:00) (139/81 - 156/75)  BP(mean): --  RR: 18 (06 Dec 2017 08:00) (16 - 18)  SpO2: 99% (06 Dec 2017 08:00) (99% - 100%)      PHYSICAL EXAM:    Constitutional: NAD  HEENT: moist mucosa  Neck: supple neck, no JVD  Respiratory: clear, no wheezing, no rales, no rhonchi   Cardiovascular: RRR, no RMG  Gastrointestinal: soft, non tender, + BS, no guarding, obese   Extremities: Ulceration Left 2nd dorsal digit with hyperkeratotic border, wound base Fibro granular, alvaro-wound erythema,   Ortho: no acute pathology   Neuro: AAOx3, no focal findings       LABS/DIAGNOSTIC TESTS:                        7.6    7.3   )-----------( 190      ( 06 Dec 2017 07:08 )             23.0     WBC Count: 7.3 K/uL ( @ 07:08)  WBC Count: 8.3 K/uL ( @ 12:32)    12    141  |  113<H>  |  58<H>  ----------------------------<  253<H>  4.5   |  17<L>  |  6.92<H>    Ca    8.2<L>      06 Dec 2017 07:08  Phos  5.0       Mg     1.9         TPro  7.3  /  Alb  2.5<L>  /  TBili  0.2  /  DBili  x   /  AST  14  /  ALT  13  /  AlkPhos  139<H>  12    Urinalysis Basic - ( 05 Dec 2017 13:09 )    Color: Yellow / Appearance: Clear / S.020 / pH: x  Gluc: x / Ketone: Negative  / Bili: Negative / Urobili: Negative   Blood: x / Protein: 500 mg/dL / Nitrite: Negative   Leuk Esterase: Negative / RBC: 2-5 /HPF / WBC 11-25 /HPF   Sq Epi: x / Non Sq Epi: Many /HPF / Bacteria: Negative /HPF      LIVER FUNCTIONS - ( 05 Dec 2017 12:32 )  Alb: 2.5 g/dL / Pro: 7.3 g/dL / ALK PHOS: 139 U/L / ALT: 13 U/L DA / AST: 14 U/L / GGT: x           PT/INR - ( 05 Dec 2017 13:09 )   PT: 10.6 sec;   INR: 0.97 ratio         PTT - ( 05 Dec 2017 13:09 )  PTT:36.2 sec  Lactate, Blood: 0.8 mmol/L ( @ 12:32)    ABG -     CULTURES:       RADIOLOGY: 47 F with PMH of Htn, DM, anemia, peripheral neuropathy presented to ED with l foot ulcer on 2nd toe x 2 weeks. Patient went to Dr Ghosh office and was prescribed Augmentin for 5 days but ulcer didn't improved and was sent to ER. Pt had bedside debridement, wound culture and biopsy today. Pt is schedule for MRI to r/o OM since XR findings were questionable. Afebrile, no leukocytosis.       FH: Breat cancer in Mother and grand mother.    REVIEW OF SYSTEMS:  [  ] Not able to illicit    General: NAD  Chest: no chest pain   GI: no abdominal pain   : no dysuria no urinary frequency,   Skin: no rash, no pruritus   Musculoskeletal:	 intact ROM, no pain  Neuro: AAOx3    PAST MEDICAL & SURGICAL HISTORY:  DM (diabetes mellitus)  Type 2 diabetes mellitus: Diabetes  Hyperlipidemia: Dyslipidemia  Essential hypertension: Hypertension  Other postprocedural status: S/P laparoscopic cholecystectomy    ALLERGIES: No Known Allergies    MEDS:  aspirin  chewable 81 milliGRAM(s) Oral daily  atorvastatin 40 milliGRAM(s) Oral at bedtime  ferrous    sulfate 325 milliGRAM(s) Oral daily  influenza   Vaccine 0.5 milliLiter(s) IntraMuscular once  insulin lispro (HumaLOG) corrective regimen sliding scale   SubCutaneous three times a day before meals  insulin lispro Injectable (HumaLOG) 15 Unit(s) SubCutaneous three times a day before meals  NIFEdipine  milliGRAM(s) Oral daily  nitroglycerin    2% Ointment 1 Inch(s) Transdermal once  nitroglycerin    2% Ointment      piperacillin/tazobactam IVPB. 3.375 Gram(s) IV Intermittent every 12 hours  pregabalin 25 milliGRAM(s) Oral daily  ranolazine 1000 milliGRAM(s) Oral two times a day    SOCIAL HISTORY:  Smoker:      FAMILY HISTORY:    VITALS:  Vital Signs Last 24 Hrs  T(C): 36.4 (06 Dec 2017 08:00), Max: 37.2 (05 Dec 2017 21:36)  T(F): 97.6 (06 Dec 2017 08:00), Max: 99 (05 Dec 2017 21:36)  HR: 93 (06 Dec 2017 08:00) (92 - 99)  BP: 151/90 (06 Dec 2017 08:00) (139/81 - 156/75)  BP(mean): --  RR: 18 (06 Dec 2017 08:00) (16 - 18)  SpO2: 99% (06 Dec 2017 08:00) (99% - 100%)      PHYSICAL EXAM:    Constitutional: NAD  HEENT: moist mucosa  Neck: supple neck, no JVD  Respiratory: clear, no wheezing, no rales, no rhonchi   Cardiovascular: RRR, no RMG  Gastrointestinal: soft, non tender, + BS, no guarding, obese   Extremities: Ulceration Left 2nd dorsal digit with hyperkeratotic border, wound base Fibro granular, alvaro-wound erythema,   Ortho: no acute pathology   Neuro: AAOx3, no focal findings       LABS/DIAGNOSTIC TESTS:                        7.6    7.3   )-----------( 190      ( 06 Dec 2017 07:08 )             23.0     WBC Count: 7.3 K/uL ( @ 07:08)  WBC Count: 8.3 K/uL ( @ 12:32)    12    141  |  113<H>  |  58<H>  ----------------------------<  253<H>  4.5   |  17<L>  |  6.92<H>    Ca    8.2<L>      06 Dec 2017 07:08  Phos  5.0     12  Mg     1.9         TPro  7.3  /  Alb  2.5<L>  /  TBili  0.2  /  DBili  x   /  AST  14  /  ALT  13  /  AlkPhos  139<H>  12    Urinalysis Basic - ( 05 Dec 2017 13:09 )    Color: Yellow / Appearance: Clear / S.020 / pH: x  Gluc: x / Ketone: Negative  / Bili: Negative / Urobili: Negative   Blood: x / Protein: 500 mg/dL / Nitrite: Negative   Leuk Esterase: Negative / RBC: 2-5 /HPF / WBC 11-25 /HPF   Sq Epi: x / Non Sq Epi: Many /HPF / Bacteria: Negative /HPF      LIVER FUNCTIONS - ( 05 Dec 2017 12:32 )  Alb: 2.5 g/dL / Pro: 7.3 g/dL / ALK PHOS: 139 U/L / ALT: 13 U/L DA / AST: 14 U/L / GGT: x           PT/INR - ( 05 Dec 2017 13:09 )   PT: 10.6 sec;   INR: 0.97 ratio         PTT - ( 05 Dec 2017 13:09 )  PTT:36.2 sec  Lactate, Blood: 0.8 mmol/L ( @ 12:32)    ABG -     CULTURES:       RADIOLOGY: 47 F with PMH of Htn, DM, anemia, peripheral neuropathy presented to ED with l foot ulcer on 2nd toe x 2 weeks. Patient went to Dr Ghosh office and was prescribed Augmentin for 5 days but ulcer didn't improved and was sent to ER. Pt had bedside debridement, wound culture and biopsy today. Pt is schedule for MRI to r/o OM since XR findings were questionable. Afebrile, no leukocytosis. Pt has CKD and has been refusing dialysis and even access. she had bedside debridement of her toe today.       FH: Breat cancer in Mother and grand mother.    REVIEW OF SYSTEMS:  [  ] Not able to illicit    General: NAD  Chest: no chest pain   GI: no abdominal pain   : no dysuria no urinary frequency,   Skin: no rash, no pruritus   Musculoskeletal:	 intact ROM, no pain  Neuro: AAOx3    PAST MEDICAL & SURGICAL HISTORY:  DM (diabetes mellitus)  Type 2 diabetes mellitus: Diabetes  Hyperlipidemia: Dyslipidemia  Essential hypertension: Hypertension  Other postprocedural status: S/P laparoscopic cholecystectomy    ALLERGIES: No Known Allergies    MEDS:  aspirin  chewable 81 milliGRAM(s) Oral daily  atorvastatin 40 milliGRAM(s) Oral at bedtime  ferrous    sulfate 325 milliGRAM(s) Oral daily  influenza   Vaccine 0.5 milliLiter(s) IntraMuscular once  insulin lispro (HumaLOG) corrective regimen sliding scale   SubCutaneous three times a day before meals  insulin lispro Injectable (HumaLOG) 15 Unit(s) SubCutaneous three times a day before meals  NIFEdipine  milliGRAM(s) Oral daily  nitroglycerin    2% Ointment 1 Inch(s) Transdermal once  nitroglycerin    2% Ointment      piperacillin/tazobactam IVPB. 3.375 Gram(s) IV Intermittent every 12 hours  pregabalin 25 milliGRAM(s) Oral daily  ranolazine 1000 milliGRAM(s) Oral two times a day    SOCIAL HISTORY:  Smoker:      FAMILY HISTORY:    VITALS:  Vital Signs Last 24 Hrs  T(C): 36.4 (06 Dec 2017 08:00), Max: 37.2 (05 Dec 2017 21:36)  T(F): 97.6 (06 Dec 2017 08:00), Max: 99 (05 Dec 2017 21:36)  HR: 93 (06 Dec 2017 08:00) (92 - 99)  BP: 151/90 (06 Dec 2017 08:00) (139/81 - 156/75)  BP(mean): --  RR: 18 (06 Dec 2017 08:00) (16 - 18)  SpO2: 99% (06 Dec 2017 08:00) (99% - 100%)      PHYSICAL EXAM:    Constitutional: NAD  HEENT: moist mucosa  Neck: supple neck, no JVD  Respiratory: clear, no wheezing, no rales, no rhonchi   Cardiovascular: RRR, no RMG  Gastrointestinal: soft, non tender, + BS, no guarding, obese   Extremities: Ulceration Left 2nd dorsal digit with hyperkeratotic border, wound base Fibro granular, alvaro-wound erythema, wound probes to bone, skin over second left toe is dusky appearing and might be gangrenous .  Ortho: no acute pathology   Neuro: AAOx3, no focal findings       LABS/DIAGNOSTIC TESTS:                        7.6    7.3   )-----------( 190      ( 06 Dec 2017 07:08 )             23.0     WBC Count: 7.3 K/uL ( @ 07:08)  WBC Count: 8.3 K/uL ( @ 12:32)    12    141  |  113<H>  |  58<H>  ----------------------------<  253<H>  4.5   |  17<L>  |  6.92<H>    Ca    8.2<L>      06 Dec 2017 07:08  Phos  5.0     12  Mg     1.9         TPro  7.3  /  Alb  2.5<L>  /  TBili  0.2  /  DBili  x   /  AST  14  /  ALT  13  /  AlkPhos  139<H>      Urinalysis Basic - ( 05 Dec 2017 13:09 )    Color: Yellow / Appearance: Clear / S.020 / pH: x  Gluc: x / Ketone: Negative  / Bili: Negative / Urobili: Negative   Blood: x / Protein: 500 mg/dL / Nitrite: Negative   Leuk Esterase: Negative / RBC: 2-5 /HPF / WBC 11-25 /HPF   Sq Epi: x / Non Sq Epi: Many /HPF / Bacteria: Negative /HPF      LIVER FUNCTIONS - ( 05 Dec 2017 12:32 )  Alb: 2.5 g/dL / Pro: 7.3 g/dL / ALK PHOS: 139 U/L / ALT: 13 U/L DA / AST: 14 U/L / GGT: x           PT/INR - ( 05 Dec 2017 13:09 )   PT: 10.6 sec;   INR: 0.97 ratio         PTT - ( 05 Dec 2017 13:09 )  PTT:36.2 sec  Lactate, Blood: 0.8 mmol/L ( @ 12:32)    ABG -     CULTURES:       RADIOLOGY:< from: Xray Toes, Left Foot (17 @ 13:01) >  EXAM:  TOE(S) LEFT FOOT                            PROCEDURE DATE:  2017          INTERPRETATION:  CLINICAL STATEMENT: Pain. Second toe necrosis    TECHNIQUE: AP, oblique and lateral views of left toes     COMPARISON: None.    FINDINGS:  Age-indeterminate deformity distal aspect of second proximal phalanx.   Soft tissue swelling. No gross bone destruction    No dislocation.    IMPRESSION:  Findings as described above . If osteomyelitis is a strong clinical   concern, consider MRI exam if there is no contraindication.      < end of copied text >

## 2017-12-06 NOTE — PROGRESS NOTE ADULT - SUBJECTIVE AND OBJECTIVE BOX
S : 47y year old Female with PMH DM, HTN, HLD, neuropathy secondary to diabetes seen at bedside for left 2nd dorsal digital ulceration.  Patient relates to having the ulceration for 2 weeks. Patient states it could be possibly rubbing against tight shoes or wearing compression stockings. Patient saw a Podiatrist on 11/29 who recommended she see Dr. Florian for a second opinion and possible surgical intervention.     Chief Complaint : Patient is a 47y old  Female who presents with a chief complaint of left 2nd toe ulceration     Patient admits to  (-) Fevers, (-) Chills, (-) Nausea, (-) Vomiting, (-) Shortness of Breath    PMH: DM (diabetes mellitus)  Type 2 diabetes mellitus  Hyperlipidemia  Essential hypertension    PSH:Other postprocedural status    Allergies:No Known Allergies    Labs:      O:   General: Pleasant  female NAD & AOX3.    Integument:  Skin warm, dry and supple bilateral.    Ulceration Left 2nd dorsal digit: + hyperkeratotic border, wound base Fibrogranular , wound size 1x 1 x 0.1 cm. +edema,  + alvaro-wound erythema, - purulence, - fluctuance, + tracking/tunneling, + probe to bone  Vascular: Dorsalis Pedis and Posterior Tibial pulses palpable 1/4. Capillary re-fill time greater than 3 seconds   Neuro: Protective sensation diminished  to the level of the digits bilateral.  MSK: Muscle strength 5/5 all major muscle groups bilateral.    Bedside debridement performed. Proper timeout protocol taken.  S: Dr. Florian  A: Dr. Hernandez, Dr. Reyna  P: Left 2nd OM  P: Same  P: Bone debridement with excision of all non-viable skin, soft tissue, and bone  P: Bone and soft tissue sent to pathology                                                                                                                                                                                                                                                                                                                                                                                            A: None  H: None  E: Minimal  M: 3-O Nylon suture  Pathology   I: None  C: Stable    Good healthy bleeding note. Adequate approximation of skin edges noted.    Deformity:  A: Left 2nd dorsal digital ulceration, s/p bedside debridement    P:   Chart reviewed and Patient evaluated  Discussed diagnosis and treatment with patient  Wound flush with normal saline to left foot  Obtained wound culture to be sent to Pathology  Obtained biopsy to be sent to pathology.  Ordered NitroPaste to be applied to Left foot.  Applied betadine and dry sterile dressing.  X-rays reviewed : < from: Xray Toes, Left Foot (12.05.17 @ 13:01) >  FINDINGS:  Age-indeterminate deformity distal aspect of second proximal phalanx.   Soft tissue swelling. No gross bone destruction.   No dislocation   IMPRESSION:  Findings as described above . If osteomyelitis is a strong clinical   concern, consider MRI exam if there is no contraindication.  Continue with IV antibiotics As Per ID  CHEYENNE reviewed.   Vascular consult appreciated   WBAT bilaterally   Discussed importance of daily foot examinations and proper shoe gear and to importance of lower Fasting Blood Glucose levels.   Podiatry will follow while in house.  Discussed with Dr. Florian

## 2017-12-06 NOTE — PROGRESS NOTE ADULT - SUBJECTIVE AND OBJECTIVE BOX
PGY1 Note discussed with supervising resident and primary attending.    Patient is a 47y old  Female who presents with a chief complaint of L foot ulcer (05 Dec 2017 16:47)      INTERVAL HPI/OVERNIGHT EVENTS:    MEDICATIONS  (STANDING):  aspirin  chewable 81 milliGRAM(s) Oral daily  atorvastatin 40 milliGRAM(s) Oral at bedtime  ferrous    sulfate 325 milliGRAM(s) Oral daily  influenza   Vaccine 0.5 milliLiter(s) IntraMuscular once  insulin glargine Injectable (LANTUS) 40 Unit(s) SubCutaneous at bedtime  insulin lispro (HumaLOG) corrective regimen sliding scale   SubCutaneous three times a day before meals  insulin lispro Injectable (HumaLOG) 15 Unit(s) SubCutaneous three times a day before meals  NIFEdipine  milliGRAM(s) Oral daily  piperacillin/tazobactam IVPB. 3.375 Gram(s) IV Intermittent every 12 hours  pregabalin 25 milliGRAM(s) Oral daily  ranolazine 1000 milliGRAM(s) Oral two times a day    MEDICATIONS  (PRN):      Allergies    No Known Allergies    Intolerances        REVIEW OF SYSTEMS:  CONSTITUTIONAL: No fever, weight loss, or fatigue  RESPIRATORY: No cough, wheezing, chills or hemoptysis; No shortness of breath  CARDIOVASCULAR: No chest pain, palpitations, dizziness, or leg swelling  GASTROINTESTINAL: No abdominal or epigastric pain. No nausea, vomiting, or hematemesis; No diarrhea or constipation. No melena or hematochezia.  NEUROLOGICAL: No headaches, memory loss, loss of strength, numbness, or tremors  SKIN: No itching, burning, rashes, or lesions     Vital Signs Last 24 Hrs  T(C): 37.1 (06 Dec 2017 05:55), Max: 37.2 (05 Dec 2017 21:36)  T(F): 98.7 (06 Dec 2017 05:55), Max: 99 (05 Dec 2017 21:36)  HR: 94 (06 Dec 2017 05:55) (92 - 99)  BP: 148/76 (06 Dec 2017 05:55) (121/64 - 156/75)  BP(mean): --  RR: 16 (06 Dec 2017 05:55) (16 - 18)  SpO2: 99% (06 Dec 2017 05:55) (99% - 100%)    PHYSICAL EXAM:  GENERAL: NAD, well-groomed, well-developed  HEAD:  Atraumatic, Normocephalic  EYES: EOMI, PERRLA, conjunctiva and sclera clear  NECK: Supple, No JVD, Normal thyroid  CHEST/LUNG: Clear to percussion bilaterally; No rales, rhonchi, wheezing, or rubs  HEART: Regular rate and rhythm; No murmurs, rubs, or gallops  ABDOMEN: Soft, Nontender, Nondistended; Bowel sounds present  NERVOUS SYSTEM:  Alert & Oriented X3, Good concentration; Motor Strength 5/5 B/L   EXTREMITIES:  Peripheral Pulses non palpable, No clubbing, cyanosis, or edema  SKIN: left 2nd dorsal digital ulceration    LABS:                        7.6    7.3   )-----------( 190      ( 06 Dec 2017 07:08 )             23.0     12-    142  |  113<H>  |  56<H>  ----------------------------<  123<H>  4.3   |  19<L>  |  6.81<H>    Ca    8.3<L>      05 Dec 2017 12:32    TPro  7.3  /  Alb  2.5<L>  /  TBili  0.2  /  DBili  x   /  AST  14  /  ALT  13  /  AlkPhos  139<H>  12-05    PT/INR - ( 05 Dec 2017 13:09 )   PT: 10.6 sec;   INR: 0.97 ratio         PTT - ( 05 Dec 2017 13:09 )  PTT:36.2 sec  Urinalysis Basic - ( 05 Dec 2017 13:09 )    Color: Yellow / Appearance: Clear / S.020 / pH: x  Gluc: x / Ketone: Negative  / Bili: Negative / Urobili: Negative   Blood: x / Protein: 500 mg/dL / Nitrite: Negative   Leuk Esterase: Negative / RBC: 2-5 /HPF / WBC 11-25 /HPF   Sq Epi: x / Non Sq Epi: Many /HPF / Bacteria: Negative /HPF      CAPILLARY BLOOD GLUCOSE      POCT Blood Glucose.: 184 mg/dL (05 Dec 2017 18:21)      RADIOLOGY & ADDITIONAL TESTS:    Imaging Personally Reviewed:  [x ] YES  [ ] NO    Consultant(s) Notes Reviewed:  [x ] YES  [ ] NO

## 2017-12-06 NOTE — CONSULT NOTE ADULT - PROBLEM SELECTOR RECOMMENDATION 3
Continue with current anti-hypertensive medications. Monitor BP.  If BP still high tomorrow, would begin BB (such as labetalol 200mg bid).  Would not begin diuretic at this time given MATTHEW.

## 2017-12-06 NOTE — CONSULT NOTE ADULT - ATTENDING COMMENTS
pt seen and examined with ID resident
[facility] NEPHROLOGY  Pardeep Edmonds M.D.  Simone Wiggins D.O.  Estephanie Kingsley M.D.  Jasmin Mosley, MSN, ANP-C    Telephone: [phone]  Facsimile: [phone]    [id number] [place] [address]

## 2017-12-06 NOTE — CONSULT NOTE ADULT - ASSESSMENT
47 year-old woman likely with MATTHEW on CKD (probably stage 5, possibly stage 4).  MATTHEW in the setting of DFU.  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 HTN with BP slightly elevated. LE edema, moderate, due to CKD.  Protienuria due to diabetic nephropathy.
acute osteomyelitis of left 2nd toe with possible gangrene of left 2nd toe    plan - cont vanco 500mgs iv q24hrs  cont zosyn 3.375gms iv q12hrs  awaiting MRI  pt will likely need amputation as she is not a candidate for a picc line given her renal function  pt appears very non compliant with treatment and does not appear to be bothered by her her underlying medical problems, she already has renal failure, neuropathy and osteo

## 2017-12-06 NOTE — CONSULT NOTE ADULT - PROBLEM SELECTOR RECOMMENDATION 9
Monitor renal function.  Obtain baseline creatinine from outpatient nephrologist,  No acute need for hemodialysis at this time

## 2017-12-07 DIAGNOSIS — N18.5 CHRONIC KIDNEY DISEASE, STAGE 5: ICD-10-CM

## 2017-12-07 LAB
ANION GAP SERPL CALC-SCNC: 12 MMOL/L — SIGNIFICANT CHANGE UP (ref 5–17)
BUN SERPL-MCNC: 65 MG/DL — HIGH (ref 7–18)
CALCIUM SERPL-MCNC: 7.5 MG/DL — LOW (ref 8.4–10.5)
CHLORIDE SERPL-SCNC: 110 MMOL/L — HIGH (ref 96–108)
CO2 SERPL-SCNC: 18 MMOL/L — LOW (ref 22–31)
CREAT SERPL-MCNC: 7.07 MG/DL — HIGH (ref 0.5–1.3)
GLUCOSE SERPL-MCNC: 274 MG/DL — HIGH (ref 70–99)
POTASSIUM SERPL-MCNC: 4.5 MMOL/L — SIGNIFICANT CHANGE UP (ref 3.5–5.3)
POTASSIUM SERPL-SCNC: 4.5 MMOL/L — SIGNIFICANT CHANGE UP (ref 3.5–5.3)
SODIUM SERPL-SCNC: 140 MMOL/L — SIGNIFICANT CHANGE UP (ref 135–145)
VANCOMYCIN TROUGH SERPL-MCNC: 14.4 UG/ML — SIGNIFICANT CHANGE UP (ref 10–20)

## 2017-12-07 PROCEDURE — 73718 MRI LOWER EXTREMITY W/O DYE: CPT | Mod: 26,LT

## 2017-12-07 PROCEDURE — 99233 SBSQ HOSP IP/OBS HIGH 50: CPT | Mod: GC

## 2017-12-07 RX ORDER — VANCOMYCIN HCL 1 G
500 VIAL (EA) INTRAVENOUS EVERY 24 HOURS
Qty: 0 | Refills: 0 | Status: DISCONTINUED | OUTPATIENT
Start: 2017-12-07 | End: 2017-12-08

## 2017-12-07 RX ORDER — SODIUM BICARBONATE 1 MEQ/ML
650 SYRINGE (ML) INTRAVENOUS DAILY
Qty: 0 | Refills: 0 | Status: DISCONTINUED | OUTPATIENT
Start: 2017-12-07 | End: 2017-12-08

## 2017-12-07 RX ORDER — DORZOLAMIDE HYDROCHLORIDE 20 MG/ML
1 SOLUTION/ DROPS OPHTHALMIC
Qty: 0 | Refills: 0 | Status: DISCONTINUED | OUTPATIENT
Start: 2017-12-07 | End: 2017-12-08

## 2017-12-07 RX ORDER — INSULIN GLARGINE 100 [IU]/ML
30 INJECTION, SOLUTION SUBCUTANEOUS EVERY MORNING
Qty: 0 | Refills: 0 | Status: DISCONTINUED | OUTPATIENT
Start: 2017-12-07 | End: 2017-12-08

## 2017-12-07 RX ORDER — BRIMONIDINE TARTRATE 2 MG/MG
1 SOLUTION/ DROPS OPHTHALMIC
Qty: 0 | Refills: 0 | Status: DISCONTINUED | OUTPATIENT
Start: 2017-12-07 | End: 2017-12-08

## 2017-12-07 RX ORDER — LATANOPROST 0.05 MG/ML
1 SOLUTION/ DROPS OPHTHALMIC; TOPICAL AT BEDTIME
Qty: 0 | Refills: 0 | Status: DISCONTINUED | OUTPATIENT
Start: 2017-12-07 | End: 2017-12-08

## 2017-12-07 RX ADMIN — LATANOPROST 1 DROP(S): 0.05 SOLUTION/ DROPS OPHTHALMIC; TOPICAL at 22:07

## 2017-12-07 RX ADMIN — ATORVASTATIN CALCIUM 40 MILLIGRAM(S): 80 TABLET, FILM COATED ORAL at 22:06

## 2017-12-07 RX ADMIN — Medication 3: at 12:45

## 2017-12-07 RX ADMIN — Medication 15 UNIT(S): at 12:45

## 2017-12-07 RX ADMIN — Medication 3: at 09:47

## 2017-12-07 RX ADMIN — Medication 15 UNIT(S): at 17:21

## 2017-12-07 RX ADMIN — RANOLAZINE 1000 MILLIGRAM(S): 500 TABLET, FILM COATED, EXTENDED RELEASE ORAL at 06:10

## 2017-12-07 RX ADMIN — RANOLAZINE 1000 MILLIGRAM(S): 500 TABLET, FILM COATED, EXTENDED RELEASE ORAL at 17:21

## 2017-12-07 RX ADMIN — Medication 325 MILLIGRAM(S): at 17:22

## 2017-12-07 RX ADMIN — Medication 1 INCH(S): at 14:56

## 2017-12-07 RX ADMIN — PIPERACILLIN AND TAZOBACTAM 12.5 GRAM(S): 4; .5 INJECTION, POWDER, LYOPHILIZED, FOR SOLUTION INTRAVENOUS at 06:10

## 2017-12-07 RX ADMIN — Medication 120 MILLIGRAM(S): at 22:06

## 2017-12-07 RX ADMIN — Medication 100 MILLIGRAM(S): at 18:22

## 2017-12-07 RX ADMIN — Medication 15 UNIT(S): at 09:48

## 2017-12-07 RX ADMIN — Medication 650 MILLIGRAM(S): at 17:21

## 2017-12-07 RX ADMIN — INSULIN GLARGINE 30 UNIT(S): 100 INJECTION, SOLUTION SUBCUTANEOUS at 10:21

## 2017-12-07 RX ADMIN — PIPERACILLIN AND TAZOBACTAM 12.5 GRAM(S): 4; .5 INJECTION, POWDER, LYOPHILIZED, FOR SOLUTION INTRAVENOUS at 18:22

## 2017-12-07 RX ADMIN — Medication 81 MILLIGRAM(S): at 17:22

## 2017-12-07 RX ADMIN — Medication 25 MILLIGRAM(S): at 06:10

## 2017-12-07 RX ADMIN — Medication 25 MILLIGRAM(S): at 22:06

## 2017-12-07 RX ADMIN — Medication 25 MILLIGRAM(S): at 17:22

## 2017-12-07 NOTE — PROGRESS NOTE ADULT - SUBJECTIVE AND OBJECTIVE BOX
Cortisone Injection    You have received a cortisone injection. The medication is a combination of a short acting anesthetic (numbing medication)  plus a steroid.     You may see some relief from the pain for several hours following the injection due to the numbing medication. Later that day or the next day you may have the same pain you had before or an increase in soreness. Swelling can also occur.  You may try a cold compress for 20 minutes on and 20 minutes off that day or over the counter medications with food (if not allergic)    Our expectation would be that you will improve on a day by day basis for the next several weeks or even longer.     Cortisone may cause a temporary (usually 2-3 days) increase in blood glucose (sugar) levels. If you have diabetes, please pay particular attention to this.     Please call the office if there are no signs of improvement after 4 weeks or if other problems develop such as an increase in swelling or redness. Our office number is [phone]    Thank you for allowing us to be of service to you.     [facility].        [facility] NEPHROLOGY- PROGRESS NOTE    Patient is a 47y Female with a/w    Hospital Medications: Medications reviewed.  REVIEW OF SYSTEMS:  CONSTITUTIONAL: No fevers or chills  RESPIRATORY: No shortness of breath  CARDIOVASCULAR: No chest pain.  GASTROINTESTINAL: No nausea, vomiting, diarrhea or abdominal pain.   VASCULAR: No bilateral lower extremity edema.     VITALS:  T(F): 98.8 (17 @ 08:28), Max: 99.3 (17 @ 16:10)  HR: 89 (17 @ 08:28)  BP: 116/64 (17 @ 08:28)  RR: 16 (17 @ 08:28)  SpO2: 98% (17 @ 08:28)  Wt(kg): --  Height (cm): 172.72 ( @ 10:16)  Weight (kg): 106.6 ( @ 10:16)  BMI (kg/m2): 35.7 (12-05 @ 10:16)  BSA (m2): 2.19 (12-05 @ 10:16)    PHYSICAL EXAM:  Constitutional: NAD  Neurological: Awake and Alert  HEENT: anicteric sclera,   Respiratory: CTAB, no wheezes, rales or rhonchi  Cardiovascular: S1, S2, RRR  Gastrointestinal: BS+, soft, NT/ND  : No marks.  Extremities: No peripheral edema    LABS:      141  |  113<H>  |  58<H>  ----------------------------<  253<H>  4.5   |  17<L>  |  6.92<H>    Ca    8.2<L>      06 Dec 2017 07:08  Phos  5.0       Mg     1.9         TPro  7.3  /  Alb  2.5<L>  /  TBili  0.2  /  DBili      /  AST  14  /  ALT  13  /  AlkPhos  139<H>      Creatinine Trend: 6.92 <--, 6.81 <--                        7.6    7.3   )-----------( 190      ( 06 Dec 2017 07:08 )             23.0     Urine Studies:  Urinalysis Basic - ( 05 Dec 2017 13:09 )    Color: Yellow / Appearance: Clear / S.020 / pH:   Gluc:  / Ketone: Negative  / Bili: Negative / Urobili: Negative   Blood:  / Protein: 500 mg/dL / Nitrite: Negative   Leuk Esterase: Negative / RBC: 2-5 /HPF / WBC 11-25 /HPF   Sq Epi:  / Non Sq Epi: Many /HPF / Bacteria: Negative /HPF      Sodium, Random Urine: 89 mmol/L ( @ 10:37)  Potassium, Random Urine: 15 mmol/L ( @ 10:37)  Creatinine, Random Urine: 43 mg/dL ( @ 10:37)    RADIOLOGY & ADDITIONAL STUDIES: [facility] NEPHROLOGY- PROGRESS NOTE    Patient is a 47y Female with CKD 5, HTN, DM-2  a/w Left foot ulcer and worsening renal function. Renal consulted for elevated SCr.     Hospital Medications: Medications reviewed.  REVIEW OF SYSTEMS: + puritis from dry skin  CONSTITUTIONAL: No fevers or chills  RESPIRATORY: No shortness of breath  CARDIOVASCULAR: No chest pain.  GASTROINTESTINAL: No nausea, vomiting, diarrhea or abdominal pain.   VASCULAR: No bilateral lower extremity edema.     VITALS:  T(F): 98.8 (17 @ 08:28), Max: 99.3 (17 @ 16:10)  HR: 89 (17 @ 08:28)  BP: 116/64 (17 @ 08:28)  RR: 16 (17 @ 08:28)  SpO2: 98% (17 @ 08:28)  Wt(kg): --  Height (cm): 172.72 ( @ 10:16)  Weight (kg): 106.6 ( @ 10:16)  BMI (kg/m2): 35.7 ( @ 10:16)  BSA (m2): 2.19 ( @ 10:16)    PHYSICAL EXAM:  Constitutional: NAD  HEENT: anicteric sclera  Respiratory: CTAB, no wheezes, rales or rhonchi  Cardiovascular: S1, S2, RRR  Gastrointestinal: BS+, soft, NT/ND  Extremities: +1 B LE edema, no asterisixs    LABS:      141  |  113<H>  |  58<H>  ----------------------------<  253<H>  4.5   |  17<L>  |  6.92<H>    Ca    8.2<L>      06 Dec 2017 07:08  Phos  5.0       Mg     1.9         TPro  7.3  /  Alb  2.5<L>  /  TBili  0.2  /  DBili      /  AST  14  /  ALT  13  /  AlkPhos  139<H>  12    Creatinine Trend: 6.92 <--, 6.81 <--                        7.6    7.3   )-----------( 190      ( 06 Dec 2017 07:08 )             23.0     Urine Studies:  Urinalysis Basic - ( 05 Dec 2017 13:09 )    Color: Yellow / Appearance: Clear / S.020 / pH:   Gluc:  / Ketone: Negative  / Bili: Negative / Urobili: Negative   Blood:  / Protein: 500 mg/dL / Nitrite: Negative   Leuk Esterase: Negative / RBC: 2-5 /HPF / WBC 11-25 /HPF   Sq Epi:  / Non Sq Epi: Many /HPF / Bacteria: Negative /HPF      Sodium, Random Urine: 89 mmol/L ( @ 10:37)  Potassium, Random Urine: 15 mmol/L ( @ 10:37)  Creatinine, Random Urine: 43 mg/dL ( @ 10:37)    RADIOLOGY & ADDITIONAL STUDIES:  < from: US Renal (17 @ 20:12) >  EXAM:  US KIDNEY(S)                            PROCEDURE DATE:  2017          INTERPRETATION:  EXAM: US KIDNEY(S)   INDICATION: ckd.  COMPARISON: None.    TECHNIQUE: Grayscale ultrasound of the kidneys was performed.    FINDINGS:    Right kidney: Normal size, measures 10.9 x 4.8 x 5.6 cm. Increased   echogenicity. No hydronephrosis, shadowing calculus, or perinephric fluid   collection.    Left kidney: Normal size, measures 11.0 x 5.5 x 5.4 cm. Increased   echogenicity. No hydronephrosis,shadowing calculus, or perinephric fluid   collection.    Moderately distended urinary bladder is grossly unremarkable.    Visualized proximal abdominal aorta and IVC are grossly unremarkable.    IMPRESSION:   No hydronephrosis.    < end of copied text >

## 2017-12-07 NOTE — DIETITIAN INITIAL EVALUATION ADULT. - PERTINENT LABORATORY DATA
Reviewed (POCT blood glucose=282- 12/7, Blood glucose=274- 12/7, A1C=9.2, Bun=58, Cr.=7.07, GFR=8, Phos=5.0, Tri= 56)

## 2017-12-07 NOTE — DIETITIAN INITIAL EVALUATION ADULT. - OTHER INFO
Patient seen for A1C >7%. Patient from home lives with family. Visited pt. with family member at beside, per pt. appetite good, eats 3meals, denies nausea/vomiting or diarrhea, Patient seen for A1C >7%. Patient from home lives with family. Visited pt. with family member at beside, per pt. appetite good, eats 3meals, denies nausea/vomiting or diarrhea but complaints of constipation PTA, stated  Lbs & gained/lost 12 Lbs as wt. fluctuates due to fluid retention. Per pt. appetite fair in the hospital stating "do not like hospital foods has no "tasted", consuming 40% of meals & tolerating, obtained food preferences. Pt. diabetic for 17yrs, on insulin with meals, do not check blood glucose levels at home, aware of diabetic meal planning but do not follow any type of diet. Pt. accepted education & copy on CDK1-4/Diabetes nutrition therapy, receptive to information give, also to f/u with outpatient Nephrologist, pedal edema 1 + pitting noted, Renal consult noted. skin intact.

## 2017-12-07 NOTE — PROGRESS NOTE ADULT - SUBJECTIVE AND OBJECTIVE BOX
S : 47y year old Female with PMH DM, HTN, HLD, neuropathy secondary to diabetes seen at bedside s/p left 2nd dorsal digital ulceration debridement.  Patient relates to having the ulceration for 2 weeks. Patient states it could be possibly rubbing against tight shoes or wearing compression stockings.    Chief Complaint : Patient is a 47y old  Female who presents with a chief complaint of left 2nd toe ulceration     Patient admits to  (-) Fevers, (-) Chills, (-) Nausea, (-) Vomiting, (-) Shortness of Breath    PMH: DM (diabetes mellitus)  Type 2 diabetes mellitus  Hyperlipidemia  Essential hypertension    PSH:Other postprocedural status    Allergies:No Known Allergies    Labs:  Vital Signs Last 24 Hrs  T(C): 36.7 (07 Dec 2017 15:28), Max: 37.1 (07 Dec 2017 08:28)  T(F): 98 (07 Dec 2017 15:28), Max: 98.8 (07 Dec 2017 08:28)  HR: 97 (07 Dec 2017 15:28) (89 - 103)  BP: 147/80 (07 Dec 2017 15:28) (116/64 - 148/74)  BP(mean): --  RR: 17 (07 Dec 2017 15:28) (16 - 17)  SpO2: 100% (07 Dec 2017 15:28) (98% - 100%)                          7.6    7.3   )-----------( 190      ( 06 Dec 2017 07:08 )             23.0     12-07    140  |  110<H>  |  65<H>  ----------------------------<  274<H>  4.5   |  18<L>  |  7.07<H>    Ca    7.5<L>      07 Dec 2017 13:21  Phos  5.0     12-06  Mg     1.9     12-06    O:   General: Pleasant  female NAD & AOX3.    Integument:  Skin warm, dry and supple bilateral.    Ulceration Left 2nd dorsal digit incision site: - hyperkeratotic border, wound base Fibrogranular. sutures appear intact, - edema,  - alvaro-wound erythema, - purulence, - fluctuance, + tracking/tunneling, + probe to bone  Vascular: Dorsalis Pedis and Posterior Tibial pulses palpable 1/4. Capillary re-fill time greater than 3 seconds   Neuro: Protective sensation diminished  to the level of the digits bilateral.  MSK: Muscle strength 5/5 all major muscle groups bilateral.    Deformity:  A: Left 2nd dorsal digital ulceration, s/p bedside debridement    P:   Chart reviewed and Patient evaluated  Discussed diagnosis and treatment with patient  Wound flush with normal saline to left foot  Obtained wound culture to be sent to Pathology  Obtained biopsy to be sent to pathology.  NitroPaste applied to Left foot.  Applied betadine and dry sterile dressing.  X-rays reviewed : < from: Xray Toes, Left Foot (12.05.17 @ 13:01) >  FINDINGS:  Age-indeterminate deformity distal aspect of second proximal phalanx.   Soft tissue swelling. No gross bone destruction.   No dislocation   IMPRESSION:  Findings as described above . If osteomyelitis is a strong clinical   concern, consider MRI exam if there is no contraindication.  Continue with IV antibiotics As Per ID  CHEYENNE reviewed.   Vascular consult appreciated   WBAT bilaterally   Discussed importance of daily foot examinations and proper shoe gear and to importance of lower Fasting Blood Glucose levels.   Podiatry will follow while in house.  Discussed with Dr. Florian

## 2017-12-07 NOTE — PROGRESS NOTE ADULT - ASSESSMENT
47 year-old woman likely with MATTHEW on CKD (probably stage 5, possibly stage 4).  MATTHEW in the setting of DFU.  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 HTN with BP slightly elevated. LE edema, moderate, due to CKD.  Protienuria due to diabetic nephropathy.

## 2017-12-07 NOTE — PROGRESS NOTE ADULT - PROBLEM SELECTOR PLAN 3
-Patient cr. is 6.8, baseline is >5 as per patient.   -No fluids given, as patient is not dehydrated.   -Not on HD  -Dr. Edmonds consulted

## 2017-12-07 NOTE — PROGRESS NOTE ADULT - SUBJECTIVE AND OBJECTIVE BOX
PGY1 Note discussed with supervising resident and primary attending.    Patient is a 47y old  Female who presents with a chief complaint of L foot ulcer (06 Dec 2017 17:42)      INTERVAL HPI/OVERNIGHT EVENTS:    MEDICATIONS  (STANDING):  aspirin  chewable 81 milliGRAM(s) Oral daily  atorvastatin 40 milliGRAM(s) Oral at bedtime  brimonidine 0.2% Ophthalmic Solution 1 Drop(s) Both EYES two times a day  dorzolamide 2% Ophthalmic Solution 1 Drop(s) Both EYES two times a day  ferrous    sulfate 325 milliGRAM(s) Oral daily  influenza   Vaccine 0.5 milliLiter(s) IntraMuscular once  insulin glargine Injectable (LANTUS) 30 Unit(s) SubCutaneous every morning  insulin lispro (HumaLOG) corrective regimen sliding scale   SubCutaneous at bedtime  insulin lispro (HumaLOG) corrective regimen sliding scale   SubCutaneous three times a day before meals  insulin lispro Injectable (HumaLOG) 15 Unit(s) SubCutaneous three times a day before meals  latanoprost 0.005% Ophthalmic Solution 1 Drop(s) Both EYES at bedtime  metoprolol     tartrate 25 milliGRAM(s) Oral two times a day  NIFEdipine  milliGRAM(s) Oral at bedtime  nitroglycerin    2% Ointment 1 Inch(s) Transdermal daily  nitroglycerin    2% Ointment      piperacillin/tazobactam IVPB. 3.375 Gram(s) IV Intermittent every 12 hours  pregabalin 25 milliGRAM(s) Oral at bedtime  ranolazine 1000 milliGRAM(s) Oral two times a day    MEDICATIONS  (PRN):      Allergies    No Known Allergies    Intolerances        REVIEW OF SYSTEMS:  CONSTITUTIONAL: No fever, weight loss, or fatigue  RESPIRATORY: No cough, wheezing, chills or hemoptysis; No shortness of breath  CARDIOVASCULAR: No chest pain, palpitations, dizziness, or leg swelling  GASTROINTESTINAL: No abdominal or epigastric pain. No nausea, vomiting, or hematemesis; No diarrhea or constipation. No melena or hematochezia.  NEUROLOGICAL: No headaches, memory loss, loss of strength, numbness, or tremors  SKIN: No itching, burning, rashes, or lesions     Vital Signs Last 24 Hrs  T(C): 37.1 (07 Dec 2017 08:28), Max: 37.4 (06 Dec 2017 16:10)  T(F): 98.8 (07 Dec 2017 08:28), Max: 99.3 (06 Dec 2017 16:10)  HR: 89 (07 Dec 2017 08:28) (89 - 103)  BP: 116/64 (07 Dec 2017 08:28) (116/64 - 162/86)  BP(mean): --  RR: 16 (07 Dec 2017 08:28) (16 - 17)  SpO2: 98% (07 Dec 2017 08:28) (98% - 100%)    PHYSICAL EXAM:  GENERAL: NAD, well-groomed, well-developed  HEAD:  Atraumatic, Normocephalic  EYES: EOMI, PERRLA, conjunctiva and sclera clear  NECK: Supple, No JVD, Normal thyroid  CHEST/LUNG: Clear to percussion bilaterally; No rales, rhonchi, wheezing, or rubs  HEART: Regular rate and rhythm; No murmurs, rubs, or gallops  ABDOMEN: Soft, Nontender, Nondistended; Bowel sounds present  NERVOUS SYSTEM:  Alert & Oriented X3, Good concentration; Motor Strength 5/5 B/L   EXTREMITIES:  Peripheral Pulses non palpable, No clubbing, cyanosis, or edema  SKIN: left 2nd dorsal digital ulceration    LABS:                        7.6    7.3   )-----------( 190      ( 06 Dec 2017 07:08 )             23.0     12-06    141  |  113<H>  |  58<H>  ----------------------------<  253<H>  4.5   |  17<L>  |  6.92<H>    Ca    8.2<L>      06 Dec 2017 07:08  Phos  5.0     12-  Mg     1.9     12-06      PT/INR - ( 05 Dec 2017 13:09 )   PT: 10.6 sec;   INR: 0.97 ratio         PTT - ( 05 Dec 2017 13:09 )  PTT:36.2 sec  Urinalysis Basic - ( 05 Dec 2017 13:09 )    Color: Yellow / Appearance: Clear / S.020 / pH: x  Gluc: x / Ketone: Negative  / Bili: Negative / Urobili: Negative   Blood: x / Protein: 500 mg/dL / Nitrite: Negative   Leuk Esterase: Negative / RBC: 2-5 /HPF / WBC 11-25 /HPF   Sq Epi: x / Non Sq Epi: Many /HPF / Bacteria: Negative /HPF      CAPILLARY BLOOD GLUCOSE      POCT Blood Glucose.: 282 mg/dL (07 Dec 2017 12:06)  POCT Blood Glucose.: 255 mg/dL (07 Dec 2017 09:43)  POCT Blood Glucose.: 234 mg/dL (07 Dec 2017 00:05)  POCT Blood Glucose.: 232 mg/dL (06 Dec 2017 21:16)  POCT Blood Glucose.: 129 mg/dL (06 Dec 2017 16:39)      RADIOLOGY & ADDITIONAL TESTS:    Imaging Personally Reviewed:  [ ] YES  [ ] NO    Consultant(s) Notes Reviewed:  [ ] YES  [ ] NO

## 2017-12-07 NOTE — PROGRESS NOTE ADULT - ASSESSMENT
acute osteomyelitis of left 2nd toe  gangrene of left 2nd toe     plan - cont vanco 500mgs iv q24hrs  cont zosyn 3.375gms iv q12hrs  will need amputation if possible as she is not a candidate for a picc line secondary to her real failure

## 2017-12-07 NOTE — DIETITIAN INITIAL EVALUATION ADULT. - NS AS NUTRI INTERV ED CONTENT
Recommended modifications/give education & copy on CKD 1-4/Diabetes nutrition therapy/Nutrition relationship to health/disease

## 2017-12-07 NOTE — DIETITIAN INITIAL EVALUATION ADULT. - PROBLEM SELECTOR PLAN 1
-Patient presented with L foot ulcer since last 2 weeks with no leucocytosis or fever.  -Xray showed Age-indeterminate deformity distal aspect of second proximal phalanx. Soft tissue swelling. No gross bone destruction  -S/p Vancomycin 1gm 1 dose. Follow Vanco level in Am since GFR is 7, and out vanco order accordingly   -Zosyn q 12, renally dosed discussed with attending.

## 2017-12-07 NOTE — PROGRESS NOTE ADULT - ASSESSMENT
47 F with PMH of Htn, DM, anemia, peripheral neuropathy presented to ED with l foot ulcer on 2nd toe x 2 weeks.    Podiatry Obtained biopsy to be sent to pathology.  -Podiatry performed bedside debridement of 2nd   -Ordered NitroPaste to be applied to Left foot.  -pending MRI results

## 2017-12-07 NOTE — PROGRESS NOTE ADULT - SUBJECTIVE AND OBJECTIVE BOX
47 F non compliant  admitted for L foot ulcer on 2nd toe x 2 weeks that failed outpatient therapy. Pt is schedule for MRI to r/o OM. Waiting for wound cultures and biopsy s/p debridement. Afebrile, no leukocytosis.       REVIEW OF SYSTEMS:  [  ] Not able to illicit  General:	  Chest:	  GI:	  :  Skin:	  Musculoskeletal:	  Neuro:	    MEDS:  piperacillin/tazobactam IVPB. 3.375 Gram(s) IV Intermittent every 12 hours    ALLERGIES: Allergies    No Known Allergies    Intolerances        VITALS:  Vital Signs Last 24 Hrs  T(C): 37.1 (07 Dec 2017 08:28), Max: 37.4 (06 Dec 2017 16:10)  T(F): 98.8 (07 Dec 2017 08:28), Max: 99.3 (06 Dec 2017 16:10)  HR: 89 (07 Dec 2017 08:28) (89 - 103)  BP: 116/64 (07 Dec 2017 08:28) (116/64 - 162/86)  BP(mean): --  RR: 16 (07 Dec 2017 08:28) (16 - 17)  SpO2: 98% (07 Dec 2017 08:28) (98% - 100%)      PHYSICAL EXAM:  HEENT:  Neck:  Respiratory:  Cardiovascular:  Gastrointestinal:  Extremities:  Skin:  Ortho:  Neuro:    LABS/DIAGNOSTIC TESTS:                        7.6    7.3   )-----------( 190      ( 06 Dec 2017 07:08 )             23.0     12-06    141  |  113<H>  |  58<H>  ----------------------------<  253<H>  4.5   |  17<L>  |  6.92<H>    Ca    8.2<L>      06 Dec 2017 07:08  Phos  5.0     12-06  Mg     1.9     12-06    TPro  7.3  /  Alb  2.5<L>  /  TBili  0.2  /  DBili  x   /  AST  14  /  ALT  13  /  AlkPhos  139<H>  12-05      CULTURES:   .Surgical Swab Left 2nd toe  12-05 @ 22:54   Rare Staphylococcus aureus  --  --      .Urine Clean Catch (Midstream)  12-05 @ 17:58   No growth  --  --      .Blood Blood-Peripheral  12-05 @ 17:50   No growth to date.  --  --        RADIOLOGY: 47 F non compliant  admitted for L foot ulcer on 2nd toe x 2 weeks that failed outpatient therapy. Awaiting  MRI results,  wound cultures and biopsy. Afebrile, no leukocytosis.       REVIEW OF SYSTEMS:  [  ] Not able to illicit    General: no fever, no chills 	  extremities: intact ROM, no pain         MEDS:  piperacillin/tazobactam IVPB. 3.375 Gram(s) IV Intermittent every 12 hours    ALLERGIES: Allergies    No Known Allergies    Intolerances        VITALS:  Vital Signs Last 24 Hrs  T(C): 37.1 (07 Dec 2017 08:28), Max: 37.4 (06 Dec 2017 16:10)  T(F): 98.8 (07 Dec 2017 08:28), Max: 99.3 (06 Dec 2017 16:10)  HR: 89 (07 Dec 2017 08:28) (89 - 103)  BP: 116/64 (07 Dec 2017 08:28) (116/64 - 162/86)  BP(mean): --  RR: 16 (07 Dec 2017 08:28) (16 - 17)  SpO2: 98% (07 Dec 2017 08:28) (98% - 100%)      PHYSICAL EXAM:    HEENT: moist mucosa   Neck: supple, no JVD   Respiratory: RRR, no wheezing, no rales, no rhonchi   Cardiovascular: RRR. no RMG  Gastrointestinal: soft, non tender, + BS, no rigidity   Extremities:   Ortho: no acute pathology       LABS/DIAGNOSTIC TESTS:                        7.6    7.3   )-----------( 190      ( 06 Dec 2017 07:08 )             23.0     12-06    141  |  113<H>  |  58<H>  ----------------------------<  253<H>  4.5   |  17<L>  |  6.92<H>    Ca    8.2<L>      06 Dec 2017 07:08  Phos  5.0     12-06  Mg     1.9     12-06    TPro  7.3  /  Alb  2.5<L>  /  TBili  0.2  /  DBili  x   /  AST  14  /  ALT  13  /  AlkPhos  139<H>  12-05      CULTURES:   .Surgical Swab Left 2nd toe  12-05 @ 22:54   Rare Staphylococcus aureus  --  --      .Urine Clean Catch (Midstream)  12-05 @ 17:58   No growth  --  --      .Blood Blood-Peripheral  12-05 @ 17:50   No growth to date.  --  --        RADIOLOGY: 47 F non compliant  admitted for L foot ulcer on 2nd toe x 2 weeks that failed outpatient therapy. MRI shows OM of middle phalanx and mid to distal portion of the proximal phalanx to the second toe. Pending wound cultures and biopsy. Vancomycin level was 12.7. Afebrile, no leukocytosis.       REVIEW OF SYSTEMS:  [  ] Not able to illicit    General: no fever, no chills 	  extremities: intact ROM, no pain         MEDS:  piperacillin/tazobactam IVPB. 3.375 Gram(s) IV Intermittent every 12 hours    ALLERGIES: Allergies    No Known Allergies    Intolerances        VITALS:  Vital Signs Last 24 Hrs  T(C): 37.1 (07 Dec 2017 08:28), Max: 37.4 (06 Dec 2017 16:10)  T(F): 98.8 (07 Dec 2017 08:28), Max: 99.3 (06 Dec 2017 16:10)  HR: 89 (07 Dec 2017 08:28) (89 - 103)  BP: 116/64 (07 Dec 2017 08:28) (116/64 - 162/86)  BP(mean): --  RR: 16 (07 Dec 2017 08:28) (16 - 17)  SpO2: 98% (07 Dec 2017 08:28) (98% - 100%)      PHYSICAL EXAM:    HEENT: moist mucosa   Neck: supple, no JVD   Respiratory: RRR, no wheezing, no rales, no rhonchi   Cardiovascular: RRR. no RMG  Gastrointestinal: soft, non tender, + BS, no rigidity   Extremities:   Ortho: no acute pathology       LABS/DIAGNOSTIC TESTS:                        7.6    7.3   )-----------( 190      ( 06 Dec 2017 07:08 )             23.0     12-06    141  |  113<H>  |  58<H>  ----------------------------<  253<H>  4.5   |  17<L>  |  6.92<H>    Ca    8.2<L>      06 Dec 2017 07:08  Phos  5.0     12-06  Mg     1.9     12-06    TPro  7.3  /  Alb  2.5<L>  /  TBili  0.2  /  DBili  x   /  AST  14  /  ALT  13  /  AlkPhos  139<H>  12-05      CULTURES:   .Surgical Swab Left 2nd toe  12-05 @ 22:54   Rare Staphylococcus aureus  --  --      .Urine Clean Catch (Midstream)  12-05 @ 17:58   No growth  --  --      .Blood Blood-Peripheral  12-05 @ 17:50   No growth to date.  --  --        RADIOLOGY: 47 F non compliant  admitted for L foot ulcer on 2nd toe x 2 weeks that failed outpatient therapy. MRI shows OM of middle phalanx and mid to distal portion of the proximal phalanx to the second toe. Pending wound cultures and biopsy. Vancomycin level was 12.7. Afebrile, no leukocytosis. pt is willing to get amputation of left second toe as it is gangrenous.      REVIEW OF SYSTEMS:  [  ] Not able to illicit    General: no fever, no chills 	  extremities: intact ROM, no pain         MEDS:  piperacillin/tazobactam IVPB. 3.375 Gram(s) IV Intermittent every 12 hours    ALLERGIES: Allergies    No Known Allergies    Intolerances        VITALS:  Vital Signs Last 24 Hrs  T(C): 37.1 (07 Dec 2017 08:28), Max: 37.4 (06 Dec 2017 16:10)  T(F): 98.8 (07 Dec 2017 08:28), Max: 99.3 (06 Dec 2017 16:10)  HR: 89 (07 Dec 2017 08:28) (89 - 103)  BP: 116/64 (07 Dec 2017 08:28) (116/64 - 162/86)  BP(mean): --  RR: 16 (07 Dec 2017 08:28) (16 - 17)  SpO2: 98% (07 Dec 2017 08:28) (98% - 100%)      PHYSICAL EXAM:    HEENT: moist mucosa   Neck: supple, no JVD   Respiratory: RRR, no wheezing, no rales, no rhonchi   Cardiovascular: RRR. no RMG  Gastrointestinal: soft, non tender, + BS, no rigidity   Extremities:   Ortho: no acute pathology       LABS/DIAGNOSTIC TESTS:                        7.6    7.3   )-----------( 190      ( 06 Dec 2017 07:08 )             23.0     12-06    141  |  113<H>  |  58<H>  ----------------------------<  253<H>  4.5   |  17<L>  |  6.92<H>    Ca    8.2<L>      06 Dec 2017 07:08  Phos  5.0     12-06  Mg     1.9     12-06    TPro  7.3  /  Alb  2.5<L>  /  TBili  0.2  /  DBili  x   /  AST  14  /  ALT  13  /  AlkPhos  139<H>  12-05      CULTURES:   .Surgical Swab Left 2nd toe  12-05 @ 22:54   Rare Staphylococcus aureus  --  --      .Urine Clean Catch (Midstream)  12-05 @ 17:58   No growth  --  --      .Blood Blood-Peripheral  12-05 @ 17:50   No growth to date.  --  --        RADIOLOGY:< from: MR Foot No Cont, Left (12.07.17 @ 13:13) >  EXAM:  MR FOOT LT                            PROCEDURE DATE:  12/07/2017          INTERPRETATION:  History: Type 2 diabetes. Left foot ulcer.    Multiplanar multisequence noncontrast MRI of the left foot was performed   from the level of the toes to the level of the talonavicular joint.    Correlation is made with prior radiographs from December 5, 2017.    Findings:    There is cutaneous irregularity overlying the dorsal aspect of the second   toe at the level of the proximal interphalangeal joint with surrounding   skin thickening and soft tissue edema consistent with cellulitis.   Cellulitis extends to the dorsum and dorsal lateral aspect of the foot.   There is osseous edema noted within the mid to distal portion of the   proximal phalanx and throughout the middle phalanx of the second toe with   corresponding hypointense T1 signal. These findings are consistent with   acute osteomyelitis. There is a small second toe proximal interphalangeal   joint effusion concerning for septic arthrosis. Marrow signal within the   remainder of the foot is otherwise preserved.    There is mild to moderate hallux valgus with mild to moderate first   metatarsophalangeal and hallux sesamoid joint arthrosis. Hammertoe   deformities of the second through fifth digits are noted. Mild arthrosis   is noted at the second through fourth tarsometatarsal articulations.    There is no evidence of tenosynovitis.    There is fatty atrophy and edema within the plantar muscles consistent   with denervationrelated changes.    Impression:    Acute osteomyelitis involving the middle phalanx and mid to distal   portion of the proximal phalanx to the second toe. Small second toe   proximal interphalangeal joint effusion concerning for septic arthrosis.   Surrounding cellulitis and overlying cutaneous irregularity is noted.        < end of copied text > 47 F non compliant  admitted for L foot ulcer on 2nd toe x 2 weeks that failed outpatient therapy. MRI shows OM of middle phalanx and mid to distal portion of the proximal phalanx to the second toe. Pending wound cultures and biopsy. Vancomycin level was 12.7. Afebrile, no leukocytosis. pt is willing to get amputation of left second toe as it is gangrenous.      REVIEW OF SYSTEMS:  [  ] Not able to illicit    General: no fever, no chills 	  extremities: intact ROM, no pain         MEDS:  piperacillin/tazobactam IVPB. 3.375 Gram(s) IV Intermittent every 12 hours    ALLERGIES: Allergies    No Known Allergies    Intolerances        VITALS:  Vital Signs Last 24 Hrs  T(C): 37.1 (07 Dec 2017 08:28), Max: 37.4 (06 Dec 2017 16:10)  T(F): 98.8 (07 Dec 2017 08:28), Max: 99.3 (06 Dec 2017 16:10)  HR: 89 (07 Dec 2017 08:28) (89 - 103)  BP: 116/64 (07 Dec 2017 08:28) (116/64 - 162/86)  BP(mean): --  RR: 16 (07 Dec 2017 08:28) (16 - 17)  SpO2: 98% (07 Dec 2017 08:28) (98% - 100%)      PHYSICAL EXAM:    HEENT: moist mucosa   Neck: supple, no JVD   Respiratory: RRR, no wheezing, no rales, no rhonchi   Cardiovascular: RRR. no RMG  Gastrointestinal: soft, non tender, + BS, no rigidity   Extremities: Ulceration Left 2nd dorsal digit with hyperkeratotic border, wound base Fibro granular, alvaro-wound erythema, wound probes to bone, appearing be gangrenous .  Ortho: no acute pathology       LABS/DIAGNOSTIC TESTS:                        7.6    7.3   )-----------( 190      ( 06 Dec 2017 07:08 )             23.0     12-06    141  |  113<H>  |  58<H>  ----------------------------<  253<H>  4.5   |  17<L>  |  6.92<H>    Ca    8.2<L>      06 Dec 2017 07:08  Phos  5.0     12-06  Mg     1.9     12-06    TPro  7.3  /  Alb  2.5<L>  /  TBili  0.2  /  DBili  x   /  AST  14  /  ALT  13  /  AlkPhos  139<H>  12-05      CULTURES:   .Surgical Swab Left 2nd toe  12-05 @ 22:54   Rare Staphylococcus aureus  --  --      .Urine Clean Catch (Midstream)  12-05 @ 17:58   No growth  --  --      .Blood Blood-Peripheral  12-05 @ 17:50   No growth to date.  --  --        RADIOLOGY:< from: MR Foot No Cont, Left (12.07.17 @ 13:13) >  EXAM:  MR FOOT LT                            PROCEDURE DATE:  12/07/2017          INTERPRETATION:  History: Type 2 diabetes. Left foot ulcer.    Multiplanar multisequence noncontrast MRI of the left foot was performed   from the level of the toes to the level of the talonavicular joint.    Correlation is made with prior radiographs from December 5, 2017.    Findings:    There is cutaneous irregularity overlying the dorsal aspect of the second   toe at the level of the proximal interphalangeal joint with surrounding   skin thickening and soft tissue edema consistent with cellulitis.   Cellulitis extends to the dorsum and dorsal lateral aspect of the foot.   There is osseous edema noted within the mid to distal portion of the   proximal phalanx and throughout the middle phalanx of the second toe with   corresponding hypointense T1 signal. These findings are consistent with   acute osteomyelitis. There is a small second toe proximal interphalangeal   joint effusion concerning for septic arthrosis. Marrow signal within the   remainder of the foot is otherwise preserved.    There is mild to moderate hallux valgus with mild to moderate first   metatarsophalangeal and hallux sesamoid joint arthrosis. Hammertoe   deformities of the second through fifth digits are noted. Mild arthrosis   is noted at the second through fourth tarsometatarsal articulations.    There is no evidence of tenosynovitis.    There is fatty atrophy and edema within the plantar muscles consistent   with denervationrelated changes.    Impression:    Acute osteomyelitis involving the middle phalanx and mid to distal   portion of the proximal phalanx to the second toe. Small second toe   proximal interphalangeal joint effusion concerning for septic arthrosis.   Surrounding cellulitis and overlying cutaneous irregularity is noted.        < end of copied text >

## 2017-12-08 VITALS
HEART RATE: 89 BPM | RESPIRATION RATE: 16 BRPM | OXYGEN SATURATION: 97 % | SYSTOLIC BLOOD PRESSURE: 132 MMHG | DIASTOLIC BLOOD PRESSURE: 74 MMHG | TEMPERATURE: 98 F

## 2017-12-08 DIAGNOSIS — I73.9 PERIPHERAL VASCULAR DISEASE, UNSPECIFIED: ICD-10-CM

## 2017-12-08 DIAGNOSIS — M86.10 OTHER ACUTE OSTEOMYELITIS, UNSPECIFIED SITE: ICD-10-CM

## 2017-12-08 LAB
-  AMPICILLIN/SULBACTAM: SIGNIFICANT CHANGE UP
-  CEFAZOLIN: SIGNIFICANT CHANGE UP
-  CIPROFLOXACIN: SIGNIFICANT CHANGE UP
-  CLINDAMYCIN: SIGNIFICANT CHANGE UP
-  ERYTHROMYCIN: SIGNIFICANT CHANGE UP
-  GENTAMICIN: SIGNIFICANT CHANGE UP
-  LEVOFLOXACIN: SIGNIFICANT CHANGE UP
-  MOXIFLOXACIN(AEROBIC): SIGNIFICANT CHANGE UP
-  OXACILLIN: SIGNIFICANT CHANGE UP
-  PENICILLIN: SIGNIFICANT CHANGE UP
-  RIFAMPIN: SIGNIFICANT CHANGE UP
-  TETRACYCLINE: SIGNIFICANT CHANGE UP
-  TRIMETHOPRIM/SULFAMETHOXAZOLE: SIGNIFICANT CHANGE UP
-  VANCOMYCIN: SIGNIFICANT CHANGE UP
DSDNA AB SER-ACNC: 14 IU/ML — SIGNIFICANT CHANGE UP
METHOD TYPE: SIGNIFICANT CHANGE UP

## 2017-12-08 PROCEDURE — 99239 HOSP IP/OBS DSCHRG MGMT >30: CPT

## 2017-12-08 RX ORDER — DORZOLAMIDE HYDROCHLORIDE 20 MG/ML
1 SOLUTION/ DROPS OPHTHALMIC
Qty: 0 | Refills: 0 | COMMUNITY
Start: 2017-12-08

## 2017-12-08 RX ORDER — LATANOPROST 0.05 MG/ML
1 SOLUTION/ DROPS OPHTHALMIC; TOPICAL
Qty: 0 | Refills: 0 | COMMUNITY
Start: 2017-12-08

## 2017-12-08 RX ORDER — BRIMONIDINE TARTRATE 2 MG/MG
1 SOLUTION/ DROPS OPHTHALMIC
Qty: 0 | Refills: 0 | COMMUNITY
Start: 2017-12-08

## 2017-12-08 RX ADMIN — INFLUENZA VIRUS VACCINE 0.5 MILLILITER(S): 15; 15; 15; 15 SUSPENSION INTRAMUSCULAR at 14:39

## 2017-12-08 RX ADMIN — Medication 81 MILLIGRAM(S): at 12:35

## 2017-12-08 RX ADMIN — INSULIN GLARGINE 30 UNIT(S): 100 INJECTION, SOLUTION SUBCUTANEOUS at 10:47

## 2017-12-08 RX ADMIN — RANOLAZINE 1000 MILLIGRAM(S): 500 TABLET, FILM COATED, EXTENDED RELEASE ORAL at 05:56

## 2017-12-08 RX ADMIN — BRIMONIDINE TARTRATE 1 DROP(S): 2 SOLUTION/ DROPS OPHTHALMIC at 05:56

## 2017-12-08 RX ADMIN — DORZOLAMIDE HYDROCHLORIDE 1 DROP(S): 20 SOLUTION/ DROPS OPHTHALMIC at 05:56

## 2017-12-08 RX ADMIN — Medication 325 MILLIGRAM(S): at 12:35

## 2017-12-08 RX ADMIN — Medication 650 MILLIGRAM(S): at 13:48

## 2017-12-08 RX ADMIN — Medication 25 MILLIGRAM(S): at 05:56

## 2017-12-08 RX ADMIN — Medication 1 INCH(S): at 12:35

## 2017-12-08 RX ADMIN — Medication 2: at 12:33

## 2017-12-08 RX ADMIN — PIPERACILLIN AND TAZOBACTAM 12.5 GRAM(S): 4; .5 INJECTION, POWDER, LYOPHILIZED, FOR SOLUTION INTRAVENOUS at 06:15

## 2017-12-08 RX ADMIN — Medication 15 UNIT(S): at 12:33

## 2017-12-08 NOTE — PROGRESS NOTE ADULT - PROBLEM SELECTOR PLAN 2
as above.
-Patient is on Levemir 50 and Novolog 20 TID, decreased it to lantus 40 and humalog 15 TID as patient can have decreased appetite in hospital. Increase Lantus/Humalog if FG runs high.  -Follow hbA1c  -Trend blood sugars
Spot Upr/Cr 9.32. Nephrotic range proteinuria with enlarged kidneys on renal US; CKD likely due to diabetic nephropathy. However, pt refuses secondary w/u for CKD during this hospitalization. f/u outpt Nephrologist.   Monitor BMP.
-Patient is on Levemir 50 and Novolog 20 TID, decreased it to lantus 40 and humalog 15 TID as patient can have decreased appetite in hospital. Increase Lantus/Humalog if FG runs high.  -Follow hbA1c  -Trend blood sugars
Spot Upr/Cr 9.32. Nephrotic range proteinuria with enlarged kidneys on renal US; CKD likely due to diabetic nephropathy. Will defer secondary w/u for CKD to outpt Nephrologist.   Monitor BMP.

## 2017-12-08 NOTE — PROGRESS NOTE ADULT - SUBJECTIVE AND OBJECTIVE BOX
S : 47y year old Female with PMH DM, HTN, HLD, neuropathy secondary to diabetes seen at bedside s/p left 2nd dorsal digital ulceration debridement.  Patient relates to having the ulceration for 2 weeks. Patient states it could be possibly rubbing against tight shoes or wearing compression stockings.    Chief Complaint : Patient is a 47y old  Female who presents with a chief complaint of left 2nd toe ulceration     Patient admits to  (-) Fevers, (-) Chills, (-) Nausea, (-) Vomiting, (-) Shortness of Breath    PMH: DM (diabetes mellitus)  Type 2 diabetes mellitus  Hyperlipidemia  Essential hypertension    PSH:Other postprocedural status    Allergies:No Known Allergies    Labs:  ICU Vital Signs Last 24 Hrs  T(C): 36.6 (08 Dec 2017 08:32), Max: 36.7 (07 Dec 2017 15:28)  T(F): 97.9 (08 Dec 2017 08:32), Max: 98 (07 Dec 2017 15:28)  HR: 89 (08 Dec 2017 08:32) (89 - 105)  BP: 132/74 (08 Dec 2017 08:32) (130/73 - 147/80)  BP(mean): --  ABP: --  ABP(mean): --  RR: 16 (08 Dec 2017 08:32) (16 - 17)  SpO2: 97% (08 Dec 2017 08:32) (97% - 100%)    12-07    140  |  110<H>  |  65<H>  ----------------------------<  274<H>  4.5   |  18<L>  |  7.07<H>    Ca    7.5<L>      07 Dec 2017 13:21        O:   General: Pleasant  female NAD & AOX3.    Integument:  Skin warm, dry and supple bilateral.    Ulceration Left 2nd dorsal digit incision site: - hyperkeratotic border, wound base Fibrogranular. sutures appear intact, - edema,  - alvaro-wound erythema, - purulence, - fluctuance, + tracking/tunneling, + probe to bone  Vascular: Dorsalis Pedis and Posterior Tibial pulses palpable 1/4. Capillary re-fill time greater than 3 seconds   Neuro: Protective sensation diminished  to the level of the digits bilateral.  MSK: Muscle strength 5/5 all major muscle groups bilateral.    Deformity:  A: Left 2nd dorsal digital ulceration, s/p bedside debridement    P:   Chart reviewed and Patient evaluated  Discussed diagnosis and treatment with patient  Wound flush with normal saline to left foot  Obtained wound culture to be sent to Pathology  Obtained biopsy to be sent to pathology.  No further surgical intervention to be performed at this time.  NitroPaste applied to Left foot.  Applied betadine and dry sterile dressing.  X-rays reviewed : < from: Xray Toes, Left Foot (12.05.17 @ 13:01) >  FINDINGS:  Age-indeterminate deformity distal aspect of second proximal phalanx.   Soft tissue swelling. No gross bone destruction.   No dislocation   IMPRESSION:  Findings as described above . If osteomyelitis is a strong clinical   concern, consider MRI exam if there is no contraindication.  Continue with IV antibiotics As Per ID  CHEYENNE reviewed.   Vascular consult appreciated   WBAT bilaterally   Discussed importance of daily foot examinations and proper shoe gear and to importance of lower Fasting Blood Glucose levels.   Podiatry will follow while in house.  Discussed with Dr. Curry

## 2017-12-08 NOTE — PROGRESS NOTE ADULT - PROVIDER SPECIALTY LIST ADULT
Infectious Disease
Infectious Disease
Internal Medicine
Nephrology
Nephrology
Podiatry
Internal Medicine
Internal Medicine

## 2017-12-08 NOTE — PROGRESS NOTE ADULT - ATTENDING COMMENTS
pt seen and examined with ID resident
pt seen and examined with ID resident
[facility] NEPHROLOGY  Pardeep Edmonds M.D.  Simone Wiggins D.O.  Estephanie Kingsley M.D.  Jasmin Mosley, MSN, ANP-C    Telephone: [phone]  Facsimile: [phone]    [address] [place] [address]
[facility] NEPHROLOGY  Pardeep Edmonds M.D.  Simone Wiggins D.O.  Estephanie Kingsley M.D.  Jasmin Mosley, MSN, ANP-C    Telephone: [phone]  Facsimile: [phone]    [address] [place] [address]
Patient was seen and examined at bedside in am, she is feeling better, her creatinine continues to worsen, today up to 7.07.  As per outpatient nephrologist, this was worsening kidney functions was attributed to Diabetic nephropathy. Yesterday I explained to her that she may need dialysis in the near future but not now. She may benefit though from vascular mapping and consultation for preparation of future AV fistula and dialysis.  Physical exam   Vitals: stable  Finger stick in 200s, but she never received any insulin yesterday, Lantus adjusted to 30 units.  HEENT: Neck supple, bilateral ? malar rash.  Heart: S1, S2 normal  Abdomen: NT, ND  Chest: clear  LE: Bilateral LE edema +1.    Impression:    Acute osteomyelitis involving the middle phalanx and mid to distal   portion of the proximal phalanx to the second toe. Small second toe   proximal interphalangeal joint effusion concerning for septic arthrosis.   Surrounding cellulitis and overlying cutaneous irregularity is noted.    A/P  1- Acute osteomyelitis of the second toe: ID input appreciated  continue with Vanco and zosyn.   vanco random daily so we can adjust vanco dose,   Podiatry input appreciated.     2-MATTHEW on CKD stage 5  Renal input appreciated,  will discuss with patient if she would like to start venous mapping, vascular consult  in preparation for AV fistula.  No emergent need for dialysis at this point.  Patient understands condition.  Will send KIANA, DsDNA, hepatitis profile.    3- Type 2 diabetes with Diabetic nephropathy, and diabetic neuropathy, and vasculopathy, hyperglycemia:  A1c: 9, patient is not compliant.   Received Lantus 30 units in am, will continue to monitor with sliding scale coverage if needed.    4- Morbid obesity    5- Medication non compliance.   rest as above.
Patient was seen and examined at bedside, she is feeling well.   Her kidney functions are deteriorated with creatinine of 6.9, on admission 6.8, as per her, the problem started back in August where she was sent to the nephrologist, meds were adjusted and being followed. Last month, her Creatinine was 5.5, and her doctor wanted to watch it more.  I explained to her that she may need dialysis in the near future, she denies any body speaking with her about placing an AV fistula in anticipation of dialysis. I explained to her and she seems to understand better now. She was seen by nephrology and pending outpatient records.  the ulcer in the left foot started around 2 weeks ago and progressively worsened till the point she was sent the hospital for IV antibiotics.  She has no idea how it started.  Physical exam:  vitals stable  HEENT: Neck supple  heart: S1 S2 normal  Abdomen: NT, ND  Chest: Clear  LE: bilateral LE edema +1-2    A/P  1- Diabetic foot ulcer: f.u Podiatry, ID  continue IV antibiotics, probably patient will need another 500 mg IV vanco and follow up  daily Vanco Trough.  MRI of the left foot to confirm if osteomyelitis.    2- CKD stage 5: Obtain outside records from nephrologist.  Patient was explained no emergent need for dialysis now but she may need in the near future  I recommended A V fistula placement  avoid nephrotoxic  Most likely secondary to Diabetic nephropathy and HTN.  Nephrotic range proteinuria.  3- Morbid obesity    4- Diabetes: patient takes lantus in am, will give her first dose of lantus in am,   continue to trend, A1c: 9, most likely patient is not compliant.    5- Metabolic acidosis: Non anion gap mainly, possibly anion gap as well   most likely secondary to renal dysfunction.    6- HTN: poorly controlled, will add metoprolol,   ACEI contraindicated due to MATTHEW vs CKD    rest as above.

## 2017-12-08 NOTE — PROGRESS NOTE ADULT - SUBJECTIVE AND OBJECTIVE BOX
47 F non compliant  admitted for L foot ulcer on 2nd toe who was found to have OM. As per podiatry patient does not need amputation.  Vascular was consulted. Afebrile, no white count. Pt continues to refused AVF for possible HD in the future.     REVIEW OF SYSTEMS:  [  ] Not able to illicit    General: no fever, no chills 	  Musculoskeletal:	 no pain      MEDS:  piperacillin/tazobactam IVPB. 3.375 Gram(s) IV Intermittent every 12 hours  vancomycin  IVPB 500 milliGRAM(s) IV Intermittent every 24 hours    ALLERGIES: Allergies    No Known Allergies    Intolerances        VITALS:  Vital Signs Last 24 Hrs  T(C): 36.6 (08 Dec 2017 08:32), Max: 36.7 (07 Dec 2017 15:28)  T(F): 97.9 (08 Dec 2017 08:32), Max: 98 (07 Dec 2017 15:28)  HR: 89 (08 Dec 2017 08:32) (89 - 105)  BP: 132/74 (08 Dec 2017 08:32) (130/73 - 147/80)  BP(mean): --  RR: 16 (08 Dec 2017 08:32) (16 - 17)  SpO2: 97% (08 Dec 2017 08:32) (97% - 100%)      PHYSICAL EXAM:    HEENT: moist  Neck: supple, no JVD  Respiratory: clear, no wheezing, no rales, no rhonchi   Cardiovascular: RRR, no RMG  Gastrointestinal: soft, non tender, + BS, no rigidity, no distension   Extremities:         LABS/DIAGNOSTIC TESTS:    12-07    140  |  110<H>  |  65<H>  ----------------------------<  274<H>  4.5   |  18<L>  |  7.07<H>    Ca    7.5<L>      07 Dec 2017 13:21        CULTURES:   .Surgical Swab left 2nd toe distal phalanx  12-06 @ 18:30   Rare Staphylococcus aureus  --  --      .Surgical Swab Left 2nd toe  12-05 @ 22:54   Rare Staphylococcus aureus  --  Staphylococcus aureus      .Urine Clean Catch (Midstream)  12-05 @ 17:58   No growth  --  --      .Blood Blood-Peripheral  12-05 @ 17:50   No growth to date.  --  --        RADIOLOGY: 47 F non compliant  admitted for L foot ulcer on 2nd toe who was found to have OM. As per podiatry patient does not need amputation.  Vascular was consulted. Afebrile, no white count. Pt continues to refused AVF for possible HD in the future.     REVIEW OF SYSTEMS:  [  ] Not able to illicit    General: no fever, no chills 	  Musculoskeletal:	 no pain      MEDS:  piperacillin/tazobactam IVPB. 3.375 Gram(s) IV Intermittent every 12 hours  vancomycin  IVPB 500 milliGRAM(s) IV Intermittent every 24 hours    ALLERGIES: Allergies    No Known Allergies    Intolerances        VITALS:  Vital Signs Last 24 Hrs  T(C): 36.6 (08 Dec 2017 08:32), Max: 36.7 (07 Dec 2017 15:28)  T(F): 97.9 (08 Dec 2017 08:32), Max: 98 (07 Dec 2017 15:28)  HR: 89 (08 Dec 2017 08:32) (89 - 105)  BP: 132/74 (08 Dec 2017 08:32) (130/73 - 147/80)  BP(mean): --  RR: 16 (08 Dec 2017 08:32) (16 - 17)  SpO2: 97% (08 Dec 2017 08:32) (97% - 100%)      PHYSICAL EXAM:    HEENT: moist  Neck: supple, no JVD  Respiratory: clear, no wheezing, no rales, no rhonchi   Cardiovascular: RRR, no RMG  Gastrointestinal: soft, non tender, + BS, no rigidity, no distension   Extremities: Ulceration Left 2nd dorsal digit incision site: - hyperkeratotic border, wound base Fibrogranular. sutures appear intact, - edema,  - alvaro-wound erythema, - purulence, - fluctuance, + tracking/tunneling, + probe to bone        LABS/DIAGNOSTIC TESTS:    12-07    140  |  110<H>  |  65<H>  ----------------------------<  274<H>  4.5   |  18<L>  |  7.07<H>    Ca    7.5<L>      07 Dec 2017 13:21        CULTURES:   .Surgical Swab left 2nd toe distal phalanx  12-06 @ 18:30   Rare Staphylococcus aureus  --  --      .Surgical Swab Left 2nd toe  12-05 @ 22:54   Rare Staphylococcus aureus  --  Staphylococcus aureus      .Urine Clean Catch (Midstream)  12-05 @ 17:58   No growth  --  --      .Blood Blood-Peripheral  12-05 @ 17:50   No growth to date.  --  --        RADIOLOGY: 47 F non compliant  admitted for L foot ulcer on 2nd toe who was found to have OM. As per podiatry patient does not need amputation.  Vascular was consulted. Afebrile, no white count. Pt continues to refuse AVF for possible HD in the future.     REVIEW OF SYSTEMS:  [  ] Not able to illicit    General: no fever, no chills 	  Musculoskeletal:	 no pain      MEDS:  piperacillin/tazobactam IVPB. 3.375 Gram(s) IV Intermittent every 12 hours  vancomycin  IVPB 500 milliGRAM(s) IV Intermittent every 24 hours    ALLERGIES: Allergies    No Known Allergies    Intolerances        VITALS:  Vital Signs Last 24 Hrs  T(C): 36.6 (08 Dec 2017 08:32), Max: 36.7 (07 Dec 2017 15:28)  T(F): 97.9 (08 Dec 2017 08:32), Max: 98 (07 Dec 2017 15:28)  HR: 89 (08 Dec 2017 08:32) (89 - 105)  BP: 132/74 (08 Dec 2017 08:32) (130/73 - 147/80)  BP(mean): --  RR: 16 (08 Dec 2017 08:32) (16 - 17)  SpO2: 97% (08 Dec 2017 08:32) (97% - 100%)      PHYSICAL EXAM:    HEENT: moist  Neck: supple, no JVD  Respiratory: clear, no wheezing, no rales, no rhonchi   Cardiovascular: RRR, no RMG  Gastrointestinal: soft, non tender, + BS, no rigidity, no distension   Extremities: Ulceration Left 2nd dorsal digit incision site: - hyperkeratotic border, wound base Fibrogranular. sutures appear intact, - edema,  - alvaro-wound erythema, - purulence, - fluctuance, + tracking/tunneling, + probe to bone        LABS/DIAGNOSTIC TESTS:    12-07    140  |  110<H>  |  65<H>  ----------------------------<  274<H>  4.5   |  18<L>  |  7.07<H>    Ca    7.5<L>      07 Dec 2017 13:21        CULTURES:   .Surgical Swab left 2nd toe distal phalanx  12-06 @ 18:30   Rare Staphylococcus aureus  --  --      .Surgical Swab Left 2nd toe  12-05 @ 22:54   Rare Staphylococcus aureus  --  Staphylococcus aureus      .Urine Clean Catch (Midstream)  12-05 @ 17:58   No growth  --  --      .Blood Blood-Peripheral  12-05 @ 17:50   No growth to date.  --  --        RADIOLOGY: 47 F non compliant  admitted for L foot ulcer on 2nd toe who was found to have OM. As per podiatry patient does not need amputation.  Vascular was consulted. Afebrile, no white count. Pt continues to refuse AVF for possible HD in the future. spoke with Dr Ghosh this am and wants to try abxs, hyperbaric oxygen and the if she has no improvement then he will amputate the toe. spoke with the pt and she is agreeable for this mode of treatment.    REVIEW OF SYSTEMS:  [  ] Not able to illicit    General: no fever, no chills 	  Musculoskeletal:	 no pain      MEDS:  piperacillin/tazobactam IVPB. 3.375 Gram(s) IV Intermittent every 12 hours  vancomycin  IVPB 500 milliGRAM(s) IV Intermittent every 24 hours    ALLERGIES: Allergies    No Known Allergies    Intolerances        VITALS:  Vital Signs Last 24 Hrs  T(C): 36.6 (08 Dec 2017 08:32), Max: 36.7 (07 Dec 2017 15:28)  T(F): 97.9 (08 Dec 2017 08:32), Max: 98 (07 Dec 2017 15:28)  HR: 89 (08 Dec 2017 08:32) (89 - 105)  BP: 132/74 (08 Dec 2017 08:32) (130/73 - 147/80)  BP(mean): --  RR: 16 (08 Dec 2017 08:32) (16 - 17)  SpO2: 97% (08 Dec 2017 08:32) (97% - 100%)      PHYSICAL EXAM:    HEENT: moist  Neck: supple, no JVD  Respiratory: clear, no wheezing, no rales, no rhonchi   Cardiovascular: RRR, no RMG  Gastrointestinal: soft, non tender, + BS, no rigidity, no distension   Extremities: Ulceration Left 2nd dorsal digit incision site: - hyperkeratotic border, wound base Fibrogranular. sutures appear intact, - edema,  - alvaro-wound erythema, - purulence, - fluctuance, + tracking/tunneling, + probe to bone        LABS/DIAGNOSTIC TESTS:    12-07    140  |  110<H>  |  65<H>  ----------------------------<  274<H>  4.5   |  18<L>  |  7.07<H>    Ca    7.5<L>      07 Dec 2017 13:21        CULTURES:   .Surgical Swab left 2nd toe distal phalanx  12-06 @ 18:30   Rare Staphylococcus aureus  --  --      .Surgical Swab Left 2nd toe  12-05 @ 22:54   Rare Staphylococcus aureus  --  Staphylococcus aureus      .Urine Clean Catch (Midstream)  12-05 @ 17:58   No growth  --  --      .Blood Blood-Peripheral  12-05 @ 17:50   No growth to date.  --  --        RADIOLOGY:

## 2017-12-08 NOTE — PROGRESS NOTE ADULT - SUBJECTIVE AND OBJECTIVE BOX
Patient is a 47y old  Female who presents with a chief complaint of L foot ulcer (06 Dec 2017 17:42)    INTERVAL HPI/OVERNIGHT EVENTS:  Patient was seen and examined at bedside, feeling well.   As per podiatry, they would like to postpone amputation of the second left toe for now, and try hyperbaric oxygen for any improvement.  meanwhile to continue on oral antibiotics: levofloxacin x 6 weeks  She is not a candidate for Picc line placement for the sake of the future AV fistula placement for dialysis.    Allergies  No Known Allergies  Intolerances    REVIEW OF SYSTEMS:  CONSTITUTIONAL: No fever, weight loss, or fatigue  EYES: No eye pain, visual disturbances, or discharge  RESPIRATORY: No cough, wheezing or shortness of breath  CARDIOVASCULAR: No chest pain, feeling of heart beats  GASTROINTESTINAL: No abdominal or epigastric pain. No nausea, vomiting; No diarrhea or constipation.  GENITOURINARY: No dysuria, frequency, hematuria  NEUROLOGICAL: No headaches  MUSCULOSKELETAL: No joint pain  PSYCHIATRIC: No depression or anxiety  HEME/LYMPH: No easy bruising, or bleeding gums  ALLERGY AND IMMUNOLOGIC: No hives or eczema    Medications:  aspirin  chewable 81 milliGRAM(s) Oral daily  atorvastatin 40 milliGRAM(s) Oral at bedtime  brimonidine 0.2% Ophthalmic Solution 1 Drop(s) Both EYES two times a day  dorzolamide 2% Ophthalmic Solution 1 Drop(s) Both EYES two times a day  ferrous    sulfate 325 milliGRAM(s) Oral daily  insulin glargine Injectable (LANTUS) 30 Unit(s) SubCutaneous every morning  insulin lispro (HumaLOG) corrective regimen sliding scale   SubCutaneous at bedtime  insulin lispro (HumaLOG) corrective regimen sliding scale   SubCutaneous three times a day before meals  insulin lispro Injectable (HumaLOG) 15 Unit(s) SubCutaneous three times a day before meals  latanoprost 0.005% Ophthalmic Solution 1 Drop(s) Both EYES at bedtime  metoprolol     tartrate 25 milliGRAM(s) Oral two times a day  NIFEdipine  milliGRAM(s) Oral at bedtime  nitroglycerin    2% Ointment 1 Inch(s) Transdermal daily  nitroglycerin    2% Ointment      piperacillin/tazobactam IVPB. 3.375 Gram(s) IV Intermittent every 12 hours  pregabalin 25 milliGRAM(s) Oral at bedtime  ranolazine 1000 milliGRAM(s) Oral two times a day  sodium bicarbonate 650 milliGRAM(s) Oral daily  vancomycin  IVPB 500 milliGRAM(s) IV Intermittent every 24 hours      PHYSICAL EXAM:  Vital Signs Last 24 Hrs  T(C): 36.6 (08 Dec 2017 08:32), Max: 36.6 (08 Dec 2017 00:20)  T(F): 97.9 (08 Dec 2017 08:32), Max: 97.9 (08 Dec 2017 08:32)  HR: 89 (08 Dec 2017 08:32) (89 - 105)  BP: 132/74 (08 Dec 2017 08:32) (130/73 - 146/82)  BP(mean): --  RR: 16 (08 Dec 2017 08:32) (16 - 16)  SpO2: 97% (08 Dec 2017 08:32) (97% - 98%)    GENERAL: NAD  HEAD:  Atraumatic, Normocephalic  EYES: EOMI, PERRLA, conjunctiva and sclera clear  ENT: moist mucous membranes  NECK: Supple, No JVD, Normal thyroid  NERVOUS SYSTEM:  Alert & Oriented X3, Good concentration; Motor Strength 5/5 B/L upper and lower extremities; DTRs 2+ intact and symmetric  CHEST/LUNG: No rales, rhonchi, wheezing, or rubs  HEART: Regular rate and rhythm; No murmurs, rubs, or gallops  ABDOMEN: Soft, Nontender, Nondistended; Bowel sounds present  EXTREMITIES:  Left second toe in dressing.   SKIN: No rashes or lesions      LABS:    12-07    140  |  110<H>  |  65<H>  ----------------------------<  274<H>  4.5   |  18<L>  |  7.07<H>    Ca    7.5<L>      07 Dec 2017 13:21                  Culture & Sensitivity:   CAPILLARY BLOOD GLUCOSE      POCT Blood Glucose.: 212 mg/dL (08 Dec 2017 11:51)  POCT Blood Glucose.: 125 mg/dL (08 Dec 2017 08:58)  POCT Blood Glucose.: 128 mg/dL (07 Dec 2017 20:44)  POCT Blood Glucose.: 122 mg/dL (07 Dec 2017 16:28)        RADIOLOGY & ADDITIONAL TESTS:  Radiology testing independently reviewed:     Consultant(s) Notes Reviewed:  [ x] YES  [ ] NO    Care Discussed with Consultants/Other Providers [ x] YES  [ ] NO

## 2017-12-08 NOTE — PROGRESS NOTE ADULT - PROBLEM SELECTOR PLAN 4
-Continue nifedipine and Ranexa
PAD: 1.4 and 1.5   severe atherosclerotic disease.   vascular consultation, will be referred as outpatient if not seen.  Vascular consult requested already.
-Continue nifedipine and Ranexa
Would not begin diuretic at this time given MATTHEW.
Would not begin diuretic at this time given MATTHEW.

## 2017-12-08 NOTE — PROGRESS NOTE ADULT - PROBLEM SELECTOR PROBLEM 4
Essential hypertension
Edema of lower extremity
Peripheral arterial disease
Edema of lower extremity
Essential hypertension

## 2017-12-08 NOTE — PROGRESS NOTE ADULT - SUBJECTIVE AND OBJECTIVE BOX
[facility] NEPHROLOGY- PROGRESS NOTE    Patient is a 47y Female with CKD 5, HTN, DM-2  a/w Left foot ulcer and worsening renal function. Renal consulted for elevated SCr.     Hospital Medications: Medications reviewed.  REVIEW OF SYSTEMS:   CONSTITUTIONAL: No fevers or chills  RESPIRATORY: No shortness of breath  CARDIOVASCULAR: No chest pain.  GASTROINTESTINAL: No nausea, vomiting, diarrhea or abdominal pain.   VASCULAR: No bilateral lower extremity edema.     VITALS:  T(F): 97.9 (17 @ 08:32), Max: 98 (17 @ 15:28)  HR: 89 (17 @ 08:32)  BP: 132/74 (17 @ 08:32)  RR: 16 (17 @ 08:32)  SpO2: 97% (17 @ 08:32)  Wt(kg): --      PHYSICAL EXAM:  Constitutional: NAD  HEENT: anicteric sclera  Respiratory: CTAB, no wheezes, rales or rhonchi  Cardiovascular: S1, S2, RRR  Gastrointestinal: BS+, soft, NT/ND  Extremities: trace LLE edema,  no asterisixs      LABS:      140  |  110<H>  |  65<H>  ----------------------------<  274<H>  4.5   |  18<L>  |  7.07<H>    Ca    7.5<L>      07 Dec 2017 13:21      Creatinine Trend: 7.07 <--, 6.92 <--, 6.81 <--    Urine Studies:  Urinalysis Basic - ( 05 Dec 2017 13:09 )    Color: Yellow / Appearance: Clear / S.020 / pH:   Gluc:  / Ketone: Negative  / Bili: Negative / Urobili: Negative   Blood:  / Protein: 500 mg/dL / Nitrite: Negative   Leuk Esterase: Negative / RBC: 2-5 /HPF / WBC 11-25 /HPF   Sq Epi:  / Non Sq Epi: Many /HPF / Bacteria: Negative /HPF      Sodium, Random Urine: 89 mmol/L ( @ 10:37)  Potassium, Random Urine: 15 mmol/L ( @ 10:37)  Creatinine, Random Urine: 43 mg/dL ( @ 10:37)

## 2017-12-08 NOTE — PROGRESS NOTE ADULT - PROBLEM SELECTOR PLAN 1
ID input appreciated  Podiatry input appreciated, would like to give trial with hyperbaric oxygen and oral antibiotics for now before proceeding with Amputation.  Patient knows and agrees.  she will follow up outpatient.
-Patient presented with L foot ulcer since last 2 weeks with no leucocytosis or fever.  -Xray showed Age-indeterminate deformity distal aspect of 2nd proximal phalanx. Soft tissue swelling. No bone destruction.  -MRI ordered  -S/p Vancomycin 1gm 1 dose. Follow Vanco level in Am since GFR is 7, and out vanco order accordingly   -Zosyn q 12, renally dosed discussed with attending.  -podiatry following
MATTHEW vs. progressive CKD.   Last SCr 5.78 on 11/27/17 at outpt Nephrologist, Dr. Martines's office [phone]. Renal US with no hydro.   Pt with worsening renal function. However, she has no uremic signs or symptoms at this time. No urgent need for hemodialysis at this time. Discussed with patient the risk/ benefits/ alternative for dialysis and the need for AVF placement to prepare for HD in the near future. 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  Discussed the risk of worsening renal function, death, hyperkalemia, etc. Pt states she understands and will f/u with her outpt Nephrologist within less than 1 week after discharge.    Strict I/Os. Avoid nephrotoxins/ NSAIDs/ RCA. Monitor BMP.
-Patient presented with L foot ulcer since last 2 weeks with no leucocytosis or fever.  -Xray showed Age-indeterminate deformity distal aspect of 2nd proximal phalanx. Soft tissue swelling. No bone destruction.  -MRI ordered  -S/p Vancomycin 1gm 1 dose. Follow Vanco level in Am since GFR is 7, and out vanco order accordingly   -Zosyn q 12, renally dosed discussed with attending.  -podiatry following  -pending surgical path  -pending MRI
MATTHEW vs. progressive CKD.   Last SCr 5.78 on 11/27/17 at outpt Nephrologist, Dr. Martines's office [phone]. Renal US with no hydro.   Pt with no uremic signs or symptoms at this time. No acute need for hemodialysis at this time. Pt understands she will need HD in the near future.   Strict I/Os. Avoid nephrotoxins/ NSAIDs/ RCA. Monitor BMP.

## 2017-12-08 NOTE — PROGRESS NOTE ADULT - PROBLEM SELECTOR PLAN 3
progressively worsening, most likely secondary to diabetes and hypertension.  Patient understands that she will need dialysis soon but does not want to proceed with the preparation for Av fistula at this moment.  She will see vascular surgery as outpatient.

## 2017-12-08 NOTE — PROGRESS NOTE ADULT - PROBLEM SELECTOR PROBLEM 3
Benign hypertensive CKD, stage 1-4 or unspecified chronic kidney disease
Chronic kidney disease, stage 5
Benign hypertensive CKD, stage 1-4 or unspecified chronic kidney disease
CKD (chronic kidney disease)
CKD (chronic kidney disease)

## 2017-12-08 NOTE — PROGRESS NOTE ADULT - PROBLEM SELECTOR PLAN 5
-Heparin q12 for dvt ppx, improve score 2, given age and immobilisation
continue current dose regimen.  Compliance stressed.
-Heparin q12 for dvt ppx, improve score 2, given age and immobilisation

## 2017-12-08 NOTE — PROGRESS NOTE ADULT - ASSESSMENT
left foot osteomyelitis with MSSA    plan - dc home today on levaquin 250mgs po q24hrs x 6 weeks  if not doing better Dr Ghosh will amputate at a later date  reconsult prn

## 2017-12-09 LAB
-  AMPICILLIN/SULBACTAM: SIGNIFICANT CHANGE UP
-  CEFAZOLIN: SIGNIFICANT CHANGE UP
-  CIPROFLOXACIN: SIGNIFICANT CHANGE UP
-  CLINDAMYCIN: SIGNIFICANT CHANGE UP
-  ERYTHROMYCIN: SIGNIFICANT CHANGE UP
-  GENTAMICIN: SIGNIFICANT CHANGE UP
-  LEVOFLOXACIN: SIGNIFICANT CHANGE UP
-  MOXIFLOXACIN(AEROBIC): SIGNIFICANT CHANGE UP
-  OXACILLIN: SIGNIFICANT CHANGE UP
-  PENICILLIN: SIGNIFICANT CHANGE UP
-  RIFAMPIN: SIGNIFICANT CHANGE UP
-  TETRACYCLINE: SIGNIFICANT CHANGE UP
-  TRIMETHOPRIM/SULFAMETHOXAZOLE: SIGNIFICANT CHANGE UP
-  VANCOMYCIN: SIGNIFICANT CHANGE UP
ANA TITR SER: NEGATIVE — SIGNIFICANT CHANGE UP
METHOD TYPE: SIGNIFICANT CHANGE UP

## 2017-12-10 LAB
CULTURE RESULTS: SIGNIFICANT CHANGE UP
CULTURE RESULTS: SIGNIFICANT CHANGE UP
SPECIMEN SOURCE: SIGNIFICANT CHANGE UP
SPECIMEN SOURCE: SIGNIFICANT CHANGE UP

## 2017-12-11 LAB
CULTURE RESULTS: SIGNIFICANT CHANGE UP
ORGANISM # SPEC MICROSCOPIC CNT: SIGNIFICANT CHANGE UP
SPECIMEN SOURCE: SIGNIFICANT CHANGE UP

## 2017-12-12 LAB — HISTONE AB SER-ACNC: 0.5 UNITS — SIGNIFICANT CHANGE UP (ref 0–0.9)

## 2017-12-19 PROCEDURE — 84443 ASSAY THYROID STIM HORMONE: CPT

## 2017-12-19 PROCEDURE — 73718 MRI LOWER EXTREMITY W/O DYE: CPT

## 2017-12-19 PROCEDURE — 93005 ELECTROCARDIOGRAM TRACING: CPT

## 2017-12-19 PROCEDURE — 86900 BLOOD TYPING SEROLOGIC ABO: CPT

## 2017-12-19 PROCEDURE — 73660 X-RAY EXAM OF TOE(S): CPT

## 2017-12-19 PROCEDURE — 84300 ASSAY OF URINE SODIUM: CPT

## 2017-12-19 PROCEDURE — 99285 EMERGENCY DEPT VISIT HI MDM: CPT | Mod: 25

## 2017-12-19 PROCEDURE — 80053 COMPREHEN METABOLIC PANEL: CPT

## 2017-12-19 PROCEDURE — 86850 RBC ANTIBODY SCREEN: CPT

## 2017-12-19 PROCEDURE — 84133 ASSAY OF URINE POTASSIUM: CPT

## 2017-12-19 PROCEDURE — 85027 COMPLETE CBC AUTOMATED: CPT

## 2017-12-19 PROCEDURE — 85730 THROMBOPLASTIN TIME PARTIAL: CPT

## 2017-12-19 PROCEDURE — 84100 ASSAY OF PHOSPHORUS: CPT

## 2017-12-19 PROCEDURE — 85610 PROTHROMBIN TIME: CPT

## 2017-12-19 PROCEDURE — 84156 ASSAY OF PROTEIN URINE: CPT

## 2017-12-19 PROCEDURE — 82607 VITAMIN B-12: CPT

## 2017-12-19 PROCEDURE — 87070 CULTURE OTHR SPECIMN AEROBIC: CPT

## 2017-12-19 PROCEDURE — 87040 BLOOD CULTURE FOR BACTERIA: CPT

## 2017-12-19 PROCEDURE — 86901 BLOOD TYPING SEROLOGIC RH(D): CPT

## 2017-12-19 PROCEDURE — 87086 URINE CULTURE/COLONY COUNT: CPT

## 2017-12-19 PROCEDURE — 86225 DNA ANTIBODY NATIVE: CPT

## 2017-12-19 PROCEDURE — 82962 GLUCOSE BLOOD TEST: CPT

## 2017-12-19 PROCEDURE — 80048 BASIC METABOLIC PNL TOTAL CA: CPT

## 2017-12-19 PROCEDURE — 82728 ASSAY OF FERRITIN: CPT

## 2017-12-19 PROCEDURE — 87186 SC STD MICRODIL/AGAR DIL: CPT

## 2017-12-19 PROCEDURE — 83516 IMMUNOASSAY NONANTIBODY: CPT

## 2017-12-19 PROCEDURE — 76775 US EXAM ABDO BACK WALL LIM: CPT

## 2017-12-19 PROCEDURE — 90686 IIV4 VACC NO PRSV 0.5 ML IM: CPT

## 2017-12-19 PROCEDURE — 80202 ASSAY OF VANCOMYCIN: CPT

## 2017-12-19 PROCEDURE — 83550 IRON BINDING TEST: CPT

## 2017-12-19 PROCEDURE — 84466 ASSAY OF TRANSFERRIN: CPT

## 2017-12-19 PROCEDURE — 81001 URINALYSIS AUTO W/SCOPE: CPT

## 2017-12-19 PROCEDURE — 80061 LIPID PANEL: CPT

## 2017-12-19 PROCEDURE — 82570 ASSAY OF URINE CREATININE: CPT

## 2017-12-19 PROCEDURE — 93925 LOWER EXTREMITY STUDY: CPT

## 2017-12-19 PROCEDURE — 86038 ANTINUCLEAR ANTIBODIES: CPT

## 2017-12-19 PROCEDURE — 87075 CULTR BACTERIA EXCEPT BLOOD: CPT

## 2017-12-19 PROCEDURE — 83036 HEMOGLOBIN GLYCOSYLATED A1C: CPT

## 2017-12-19 PROCEDURE — 93306 TTE W/DOPPLER COMPLETE: CPT

## 2017-12-19 PROCEDURE — 83735 ASSAY OF MAGNESIUM: CPT

## 2017-12-19 PROCEDURE — 83605 ASSAY OF LACTIC ACID: CPT

## 2018-01-05 ENCOUNTER — APPOINTMENT (OUTPATIENT)
Dept: VASCULAR SURGERY | Facility: CLINIC | Age: 48
End: 2018-01-05

## 2019-10-09 NOTE — ED ADULT NURSE NOTE - INTEGUMENTARY WDL
I provided a short prescription of Hydrocodone to get her to the 10-7-19 office visit which she cancelled due to going out of town. Based on the PDMP, the earliest she could  the prescription is tomorrow. I will only provide enough to get her to her next office visit.  Please queue up Hydrocodone 7.5/25 q 6hrs PRN # [id number] and requeue  her Fentanyl 25 mcg/hr q 72 hrs #3 patches. Please make sure to cancel any other pending refills as I would like to see her prior to next refill.    Color consistent with ethnicity/race, warm, dry intact, resilient.

## 2020-06-15 NOTE — ED PROVIDER NOTE - CPE EDP GASTRO NORM
ACUTE APPENDICITIS    ADMIT   NPO  ZOSYN   TO OR FOR LAPAROSCOPIC POSSIBLE OPEN APPENDECTOMY
normal...

## 2021-09-24 NOTE — ED ADULT TRIAGE NOTE - AS TEMP SITE
The patient was referred by Helga Mckeon PA-C for colon cancer screening .   A copy of this document is being forwarded to the referring provider.      Ms. Drew is a 49 year-old female who presents for a colon cancer screening. Last colonoscopy: 11/2019 in [place]- was getting them done there for insurance reasons x10 years. Has had polyps with every colonoscopy- 6 last time. Been doing 1 year follow-ups, but did not get one done last year due to COVID. Both parents with colon cancer- diagnosed in 60's. Patient denies nausea, heartburn, dysphagia, abdominal pain, rectal bleeding, unintentional weight loss, and loss of appetite. She reports she was typically constipated, but now on new depression meds and having daily bowel movements. She denies blood thinner use, pacemaker/defibrillator, diabetes, kidney disease, and home O2.
oral

## 2022-05-12 NOTE — PROGRESS NOTE ADULT - PROBLEM SELECTOR PROBLEM 1
no lesions,  no deformities,  no traumatic injuries,  no significant scars are present,  chest wall non-tender,  no masses present, breathing is unlabored without accessory muscle use,normal breath sounds
Acute osteomyelitis
Acute kidney injury
Acute kidney injury
Diabetic foot ulcer
Diabetic foot ulcer